# Patient Record
Sex: MALE | Race: BLACK OR AFRICAN AMERICAN | NOT HISPANIC OR LATINO | Employment: FULL TIME | URBAN - METROPOLITAN AREA
[De-identification: names, ages, dates, MRNs, and addresses within clinical notes are randomized per-mention and may not be internally consistent; named-entity substitution may affect disease eponyms.]

---

## 2021-06-15 ENCOUNTER — OFFICE VISIT (OUTPATIENT)
Dept: DERMATOLOGY | Facility: CLINIC | Age: 41
End: 2021-06-15
Payer: COMMERCIAL

## 2021-06-15 ENCOUNTER — APPOINTMENT (OUTPATIENT)
Dept: LAB | Facility: CLINIC | Age: 41
End: 2021-06-15
Payer: COMMERCIAL

## 2021-06-15 DIAGNOSIS — Z00.6 RESEARCH SUBJECT: Primary | ICD-10-CM

## 2021-06-15 PROCEDURE — 99207 PR NO CHARGE LOS: CPT

## 2021-06-15 NOTE — PATIENT INSTRUCTIONS

## 2021-06-15 NOTE — LETTER
6/15/2021       RE: Camden Wilson  [address] [facility] [address]     Dear Colleague,    Thank you for referring your patient, Camden Wilson, to the [facility] DERMATOLOGY CLINIC [facility]. Please see a copy of my visit note below.    [facility]  Nucleotide Excision Repair (NER) In Human Populations Study   Wong 15, 2021    Study Participant Name: Camden Wilson   : 1980    Study Participant: #4    Study Procedures Performed:  1. Patient consented and consent form (English) signed. Patient provided copy of consent form.   2. Patient provided $75 ClinCard as compensation for study participation; W-9 form completed.   3. NER questionnaire completed  4. Two 2 mm punch biopsies performed (see procedure note below)  5. Blood draw performed    Biopsy - Chronically Sun-Exposed Site (A): L dorsal hand  Punch biopsy:  After discussion of benefits and risks including but not limited to bleeding/bruising, pain/swelling, infection, scar, incomplete removal, nerve damage/numbness, recurrence, and non-diagnostic biopsy, written consent, verbal consent and photographs were obtained. Time-out was performed. The area was cleaned with isopropyl alcohol. 0.5mL of 1% lidocaine with epinephrine was injected to obtain adequate anesthesia of the lesion. A 2 mm punch biopsy was performed.  5-0 fast absorbing gut sutures were utilized to approximate the epidermal edges.  White petroleum jelly/VaselineTM and a bandage was applied to the wound.  Explicit verbal and written wound care instructions were provided.  The patient left the Dermatology Clinic in good condition.     Biopsy - Unexposed Site (B): L proximal inner arm  Punch biopsy:  After discussion of benefits and risks including but not limited to bleeding/bruising, pain/swelling, infection, scar, incomplete removal, nerve damage/numbness, recurrence, and non-diagnostic  biopsy, written consent, verbal consent and photographs were obtained. Time-out was performed. The area was cleaned with isopropyl alcohol. 0.5mL of 1% lidocaine with epinephrine was injected to obtain adequate anesthesia of the lesion. A 2 mm punch biopsy was performed.  5-0 fast absorbing gut sutures were utilized to approximate the epidermal edges.  White petroleum jelly/VaselineTM and a bandage was applied to the wound.  Explicit verbal and written wound care instructions were provided.  The patient left the Dermatology Clinic in good condition.    Staff Involved:  Fay Lopez MD - Research Fellow  Omer Recinos MD -     Omer Recinos MD  Pronouns: he/him/his    Department of Dermatology  [facility]: Phone: [phone], Fax:[phone]  [facility]: Phone: [phone] Fax: [phone]          Again, thank you for allowing me to participate in the care of your patient.      Sincerely,    Research Coordinator

## 2021-06-15 NOTE — PROGRESS NOTES
[facility]  Nucleotide Excision Repair (NER) In Human Populations Study   Wong 15, 2021    Study Participant Name: Camden Wilson   : 1980    Study Participant: #4    Study Procedures Performed:  1. Patient consented and consent form (English) signed. Patient provided copy of consent form.   2. Patient provided $75 ClinCard as compensation for study participation; W-9 form completed.   3. NER questionnaire completed  4. Two 2 mm punch biopsies performed (see procedure note below)  5. Blood draw performed    Biopsy - Chronically Sun-Exposed Site (A): L dorsal hand  Punch biopsy:  After discussion of benefits and risks including but not limited to bleeding/bruising, pain/swelling, infection, scar, incomplete removal, nerve damage/numbness, recurrence, and non-diagnostic biopsy, written consent, verbal consent and photographs were obtained. Time-out was performed. The area was cleaned with isopropyl alcohol. 0.5mL of 1% lidocaine with epinephrine was injected to obtain adequate anesthesia of the lesion. A 2 mm punch biopsy was performed.  5-0 fast absorbing gut sutures were utilized to approximate the epidermal edges.  White petroleum jelly/VaselineTM and a bandage was applied to the wound.  Explicit verbal and written wound care instructions were provided.  The patient left the Dermatology Clinic in good condition.     Biopsy - Unexposed Site (B): L proximal inner arm  Punch biopsy:  After discussion of benefits and risks including but not limited to bleeding/bruising, pain/swelling, infection, scar, incomplete removal, nerve damage/numbness, recurrence, and non-diagnostic biopsy, written consent, verbal consent and photographs were obtained. Time-out was performed. The area was cleaned with isopropyl alcohol. 0.5mL of 1% lidocaine with epinephrine was injected to obtain adequate anesthesia of the lesion. A 2 mm punch biopsy was performed.  5-0 fast absorbing gut sutures were utilized to  approximate the epidermal edges.  White petroleum jelly/VaselineTM and a bandage was applied to the wound.  Explicit verbal and written wound care instructions were provided.  The patient left the Dermatology Clinic in good condition.    Staff Involved:  Fay Lopez MD - Research Fellow  Omer Recinos MD -     Omer Recinos MD  Pronouns: he/him/his    Department of Dermatology  [facility]: Phone: [phone], Fax:[phone]  [facility]: Phone: [phone] Fax: [phone]

## 2021-06-15 NOTE — LETTER
Date:June 25, 2021      Patient was self referred, no letter generated. Do not send.        [facility] Health Information

## 2021-06-18 ENCOUNTER — OFFICE VISIT (OUTPATIENT)
Dept: DERMATOLOGY | Facility: CLINIC | Age: 41
End: 2021-06-18
Payer: COMMERCIAL

## 2021-06-18 DIAGNOSIS — L72.0 EIC (EPIDERMAL INCLUSION CYST): ICD-10-CM

## 2021-06-18 DIAGNOSIS — L64.9 ANDROGENETIC ALOPECIA: ICD-10-CM

## 2021-06-18 DIAGNOSIS — L30.9 DERMATITIS: ICD-10-CM

## 2021-06-18 DIAGNOSIS — L73.0 ACNE NUCHAE KELOIDALIS: ICD-10-CM

## 2021-06-18 PROCEDURE — 99203 OFFICE O/P NEW LOW 30 MIN: CPT | Mod: 25 | Performed by: PHYSICIAN ASSISTANT

## 2021-06-18 PROCEDURE — 11900 INJECT SKIN LESIONS </W 7: CPT | Performed by: PHYSICIAN ASSISTANT

## 2021-06-18 RX ORDER — FLUTICASONE PROPIONATE 44 UG/1
1 AEROSOL, METERED RESPIRATORY (INHALATION)
COMMUNITY
Start: 2020-01-14

## 2021-06-18 RX ORDER — TRIAMCINOLONE ACETONIDE 5 MG/G
OINTMENT TOPICAL 2 TIMES DAILY
COMMUNITY
End: 2021-06-18

## 2021-06-18 RX ORDER — CLOBETASOL PROPIONATE 0.5 MG/ML
SOLUTION TOPICAL 2 TIMES DAILY
COMMUNITY
End: 2021-06-18

## 2021-06-18 RX ORDER — CLOBETASOL PROPIONATE 0.5 MG/ML
SOLUTION TOPICAL 2 TIMES DAILY
Qty: 50 ML | Refills: 1 | Status: SHIPPED | OUTPATIENT
Start: 2021-06-18 | End: 2022-07-28

## 2021-06-18 RX ORDER — IRBESARTAN 300 MG/1
300 TABLET ORAL DAILY
COMMUNITY
Start: 2021-01-28 | End: 2022-07-12

## 2021-06-18 RX ORDER — HYDRALAZINE HYDROCHLORIDE 50 MG/1
TABLET, FILM COATED ORAL
COMMUNITY
Start: 2021-06-14

## 2021-06-18 RX ORDER — FLUOCINONIDE TOPICAL SOLUTION USP, 0.05% 0.5 MG/ML
SOLUTION TOPICAL
COMMUNITY
Start: 2019-07-10 | End: 2021-06-18

## 2021-06-18 RX ORDER — SPIRONOLACTONE 25 MG/1
25 TABLET ORAL DAILY
COMMUNITY
Start: 2021-01-28 | End: 2022-07-12

## 2021-06-18 RX ORDER — AMLODIPINE BESYLATE 10 MG/1
10 TABLET ORAL
COMMUNITY
Start: 2021-01-28 | End: 2022-07-12

## 2021-06-18 RX ORDER — TRIAMCINOLONE ACETONIDE 5 MG/G
1 OINTMENT TOPICAL 2 TIMES DAILY
Qty: 45 G | Refills: 3 | Status: SHIPPED | OUTPATIENT
Start: 2021-06-18 | End: 2021-11-22

## 2021-06-18 RX ORDER — CEPHALEXIN 500 MG/1
500 CAPSULE ORAL
COMMUNITY
Start: 2019-07-10 | End: 2021-06-18

## 2021-06-18 RX ORDER — MOMETASONE FUROATE MONOHYDRATE 50 UG/1
2 SPRAY, METERED NASAL
COMMUNITY
Start: 2021-01-28

## 2021-06-18 RX ORDER — CETIRIZINE HYDROCHLORIDE 10 MG/1
10 TABLET ORAL
COMMUNITY

## 2021-06-18 RX ORDER — CEPHALEXIN 500 MG/1
500 CAPSULE ORAL 2 TIMES DAILY
Qty: 60 CAPSULE | Refills: 0 | Status: SHIPPED | OUTPATIENT
Start: 2021-06-18 | End: 2022-08-31

## 2021-06-18 ASSESSMENT — PAIN SCALES - GENERAL: PAINLEVEL: NO PAIN (0)

## 2021-06-18 NOTE — PROGRESS NOTES
[facility] Dermatology Note  Encounter Date: Jun 18, 2021  Office Visit      Dermatology Problem List:  1. Acne Nuchae Keloidalis  -ILK 10 6/18/21  -clobetasol 0.05% solution, Keflex 500mg po BID x3-4 days prn with flares  2. Dermatitis - TAC 0.5% ointment, emollients  3. EIC - R upper mid back - derm surg referral placed  4. Alopecia, androgenetic - minoxidil 5% foam    Social: From [place]. Works for [employer]  ____________________________________________    Assessment & Plan:  # Cyst. R upper mid back  - referral for derm surg for excision    # Acne Nuchae keloidalis. Relatively well controlled on the following:   - IL-K injections performed today (see procedure note(s) below).  - Continue with clobetasol 0.05% solution, refilled today  - Keflex 500mg po BID x 3-4 days with flares - he does this 3-4x per year and it works well for him    # Eczematous dermatitis. Stable on the following:  - TAC - 2x per week, refilled today  - Moisturizers daily     # Androgenetic alopeica   - start minoxidil 5% foam daily, edu that it may take 3-4mo for results.   - future: finasteride    Procedures Performed:   - Intra-lesional triamcinolone procedure note. After positioning and cleansing with isopropyl alcohol, 1 total mL of triamcinolone 10 mg/mL was injected into 7 lesion(s) on the posterior scalp and R neck. The patient tolerated the procedure well and left the dermatology clinic in good condition.     Follow-up: prn for new or changing lesions    Staff:     All risks, benefits and alternatives were discussed with patient.  Patient is in agreement and understands the assessment and plan.  All questions were answered.    Ann Marie Jordan PA-C, MPAS  [facility] [place]: Phone: [phone], Fax: [phone]  [facility]: Phone: [phone],  Fax:  "[phone]  ____________________________________________    CC: Derm Problem (Camden is here because he says \"I need injections\")      HPI:  Mr. Camden Wilson is a 40 year old male who presents today as a return patient for a condition of the scalp. Last seen for research study with Dr. Recinos. Here today regarding several skin concerns including a hx of acne keloidalis. From [place], and saw dermatology there. Used to get ILK to the scalp about once per year. He has not had injections since last October. He has a fairly good regimen in placed that works well for him and keeps things mostly stable. He gets ILK about 1-2x per year, uses clobetasol liquid at night. Wears hair relatively short usually. Has an Rx for Keflex 500mg po, which he only takes with flares, and takes for just a few days. Additionally notes a lump on his back, it is not currently bothering him and knows removal would potential result in a scar, but is requesting a referral for excision so it is in the system for when he decides to pursue treatment. Notes he has eczema on his back. Right now it is well controlled. He uses triamcinolone 0.05% ointment once daily with flares but otherwise is diligent with moisturizers after bathing and this mostly controls it.     Patient is otherwise feeling well, without additional concerns.    Labs:  none    Physical Exam:  Vitals: There were no vitals taken for this visit.  SKIN: Focused examination of head and neck was performed.   - occipital scalp with scattered hyperpigmented papules, few on the lateral aspects of the neck and beard area as well  - 2cm cystic lesion on the R upper mid back, non tender, discrete  - receeding frontal hairline as well as some hair loss over the crown as well, otherwise scalp appears nml  - back appears nml, no eczematous patches today, but this is where he normally get them  - Osorio's skin type IV  - No other lesions of concern on areas examined. "     Medications:  Current Outpatient Medications   Medication     cephALEXin (KEFLEX) 500 MG capsule     cetirizine (ZYRTEC) 10 MG tablet     clobetasol (TEMOVATE) 0.05 % external solution     fluticasone (FLOVENT HFA) 44 MCG/ACT inhaler     hydrALAZINE (APRESOLINE) 50 MG tablet     irbesartan (AVAPRO) 300 MG tablet     mometasone (NASONEX) 50 MCG/ACT nasal spray     spironolactone (ALDACTONE) 25 MG tablet     triamcinolone (KENALOG) 0.5 % external ointment     amLODIPine (NORVASC) 10 MG tablet     fluocinonide (LIDEX) 0.05 % external solution     No current facility-administered medications for this visit.       Past Medical/Surgical History:   There is no problem list on file for this patient.    No past medical history on file.    CC Dr. Claudio on close of this encounter.

## 2021-06-18 NOTE — LETTER
6/18/2021       RE: Camden Wilson  [address]  [place] [facility] [place] [facility] [address]     Dear Colleague,    Thank you for referring your patient, Camden Wilson, to the [facility] DERMATOLOGY CLINIC [place] at [facility]. Please see a copy of my visit note below.    [facility] Dermatology Note  Encounter Date: Jun 18, 2021  Office Visit      Dermatology Problem List:  1. Acne Nuchae Keloidalis  -ILK 10 6/18/21  -clobetasol 0.05% solution, Keflex 500mg po BID x3-4 days prn with flares  2. Dermatitis - TAC 0.5% ointment, emollients  3. EIC - R upper mid back - derm surg referral placed  4. Alopecia, androgenetic - minoxidil 5% foam    Social: From [place]. Works for [employer]  ____________________________________________    Assessment & Plan:  # Cyst. R upper mid back  - referral for derm surg for excision    # Acne Nuchae keloidalis. Relatively well controlled on the following:   - IL-K injections performed today (see procedure note(s) below).  - Continue with clobetasol 0.05% solution, refilled today  - Keflex 500mg po BID x 3-4 days with flares - he does this 3-4x per year and it works well for him    # Eczematous dermatitis. Stable on the following:  - TAC - 2x per week, refilled today  - Moisturizers daily     # Androgenetic alopeica   - start minoxidil 5% foam daily, edu that it may take 3-4mo for results.   - future: finasteride    Procedures Performed:   - Intra-lesional triamcinolone procedure note. After positioning and cleansing with isopropyl alcohol, 1 total mL of triamcinolone 10 mg/mL was injected into 7 lesion(s) on the posterior scalp and R neck. The patient tolerated the procedure well and left the dermatology clinic in good condition.     Follow-up: prn for new or changing lesions    Staff:     All risks, benefits and alternatives were discussed with patient.  Patient is in agreement and  "understands the assessment and plan.  All questions were answered.    Ann Marie Jordan PA-C, MPAS  [facility] [place]: Phone: [phone], Fax: [phone]  [facility]: Phone: [phone],  Fax: [phone]  ____________________________________________    CC: Derm Problem (Camden is here because he says \"I need injections\")      HPI:  Mr. Camden Wilson is a 40 year old male who presents today as a return patient for a condition of the scalp. Last seen for research study with Dr. Recinos. Here today regarding several skin concerns including a hx of acne keloidalis. From [place], and saw dermatology there. Used to get ILK to the scalp about once per year. He has not had injections since last October. He has a fairly good regimen in placed that works well for him and keeps things mostly stable. He gets ILK about 1-2x per year, uses clobetasol liquid at night. Wears hair relatively short usually. Has an Rx for Keflex 500mg po, which he only takes with flares, and takes for just a few days. Additionally notes a lump on his back, it is not currently bothering him and knows removal would potential result in a scar, but is requesting a referral for excision so it is in the system for when he decides to pursue treatment. Notes he has eczema on his back. Right now it is well controlled. He uses triamcinolone 0.05% ointment once daily with flares but otherwise is diligent with moisturizers after bathing and this mostly controls it.     Patient is otherwise feeling well, without additional concerns.    Labs:  none    Physical Exam:  Vitals: There were no vitals taken for this visit.  SKIN: Focused examination of head and neck was performed.   - occipital scalp with scattered hyperpigmented papules, few on the lateral aspects of the neck and beard area as well  - 2cm cystic lesion on the R upper mid back, non tender, discrete  - " receeding frontal hairline as well as some hair loss over the crown as well, otherwise scalp appears nml  - back appears nml, no eczematous patches today, but this is where he normally get them  - Osorio's skin type IV  - No other lesions of concern on areas examined.     Medications:  Current Outpatient Medications   Medication     cephALEXin (KEFLEX) 500 MG capsule     cetirizine (ZYRTEC) 10 MG tablet     clobetasol (TEMOVATE) 0.05 % external solution     fluticasone (FLOVENT HFA) 44 MCG/ACT inhaler     hydrALAZINE (APRESOLINE) 50 MG tablet     irbesartan (AVAPRO) 300 MG tablet     mometasone (NASONEX) 50 MCG/ACT nasal spray     spironolactone (ALDACTONE) 25 MG tablet     triamcinolone (KENALOG) 0.5 % external ointment     amLODIPine (NORVASC) 10 MG tablet     fluocinonide (LIDEX) 0.05 % external solution     No current facility-administered medications for this visit.       Past Medical/Surgical History:   There is no problem list on file for this patient.    No past medical history on file.    CC Dr. Claudio on close of this encounter.

## 2021-06-18 NOTE — NURSING NOTE
Drug Administration Record    Prior to injection, verified patient identity using patient's name and date of birth.  Due to injection administration, patient instructed to remain in clinic for 15 minutes  afterwards, and to report any adverse reaction to me immediately.    Drug Name: triamcinolone acetonide(kenalog)  Dose: 1mL of triamcinolone 10mg/mL, 10mg dose  Route administered: ID  NDC #: Kenalog-10 (9857-3720-89)  Amount of waste(mL):4mL  Reason for waste: Single use vial    LOT #: [id number]  SITE: see provider's note  : [employer]  EXPIRATION DATE: 09/2022

## 2021-06-18 NOTE — NURSING NOTE
"Dermatology Rooming Note    Camden Wilson's goals for this visit include:   Chief Complaint   Patient presents with     Derm Problem     Camden is here because he says \"I need injections\"     Nadiya Boateng, CRISTY    "

## 2021-06-21 ENCOUNTER — TELEPHONE (OUTPATIENT)
Dept: DERMATOLOGY | Facility: CLINIC | Age: 41
End: 2021-06-21

## 2021-06-21 NOTE — TELEPHONE ENCOUNTER
patient will be calling back to schedule once he knows what the Fall looks like     Alessandro Tavera, Giselle

## 2021-06-27 ENCOUNTER — HEALTH MAINTENANCE LETTER (OUTPATIENT)
Age: 41
End: 2021-06-27

## 2021-08-23 ENCOUNTER — OFFICE VISIT (OUTPATIENT)
Dept: DERMATOLOGY | Facility: CLINIC | Age: 41
End: 2021-08-23
Payer: COMMERCIAL

## 2021-08-23 DIAGNOSIS — L70.0 ACNE VULGARIS: Primary | ICD-10-CM

## 2021-08-23 PROCEDURE — 99214 OFFICE O/P EST MOD 30 MIN: CPT | Performed by: PHYSICIAN ASSISTANT

## 2021-08-23 RX ORDER — DOXYCYCLINE 100 MG/1
100 CAPSULE ORAL 2 TIMES DAILY
Qty: 60 CAPSULE | Refills: 3 | Status: SHIPPED | OUTPATIENT
Start: 2021-08-23 | End: 2021-11-22

## 2021-08-23 RX ORDER — TRETINOIN 0.5 MG/G
CREAM TOPICAL AT BEDTIME
Qty: 45 G | Refills: 3 | Status: SHIPPED | OUTPATIENT
Start: 2021-08-23 | End: 2023-07-05

## 2021-08-23 ASSESSMENT — PAIN SCALES - GENERAL: PAINLEVEL: NO PAIN (0)

## 2021-08-23 NOTE — LETTER
8/23/2021       RE: Camden Wilson  [address]  [place] [address]     Dear Colleague,    Thank you for referring your patient, Camden Wilson, to the [facility] DERMATOLOGY CLINIC [place] at [facility]. Please see a copy of my visit note below.    [facility] Dermatology Note  Encounter Date: Aug 23, 2021  Office Visit     Dermatology Problem List:  #. Acne Nuchae Keloidalis  -ILK 10 6/18/21  -clobetasol 0.05% solution, Keflex 500mg po BID x3-4 days prn with flares  #. Cystic acne, back  - Current tx: doxycycline 100 mg bid, tretinoin 0.05% cream  #. Dermatitis - TAC 0.5% ointment, emollients  #. EIC - R upper mid back - derm surg referral placed  #. 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   ____________________________________________    Assessment & Plan:    # Cystic and inflammatory acne, back  - Start doxycycline 100 mg bid. Reviewed risks/benefits, including GI upset, sun sensitivity.   - Start tretinoin 0.05% cream to the back at bedtime.     # Eczematous dermatitis. Stable on the following:  - Continue TAC - 2x per week  - Moisturizers daily     # EIC, right back  - Plan for removal with derm surgery. Pt to schedule.      Procedures Performed:   None    Follow-up: 3 months or sooner as needed.     Staff and Scribe:     Provider Disclosure:   The documentation recorded by the scribe accurately reflects the services I personally performed and the decisions made by me.    All risks, benefits and alternatives were discussed with patient.  Patient is in agreement and understands the assessment and plan.  All questions were answered.  Sun Screen Education was given.   Return to Clinic in 3 months or sooner as needed.   Polly Diaz PA-C   [facility] Dermatology [facility]     Scribe Disclosure:  ILeticia am  serving as a scribe to document services personally performed by Polly Diaz PA-C based on data collection and the provider's statements to me.   ____________________________________________    CC: Derm Problem (Camden is here for a skin check on his back, says he has several spots that he would like evaluated today)    HPI:  Mr. Camden Wilson is a(n) 40 year old male who presents today as a return patient for FBSE.    Last seen on 6/18/21 by Ann Marie Jordan PA-C, at which time 1 mL of Kenalog 10 was injected into the posterior scalp and R neck for treatment of acne nuchae keloidalis. Additionally, he was advised to continue clobetasol 0.05% solution and start keflex for flares. For treatment of eczematous dermatitis, patient was to continue TAC 2x weekly prn. For treatment of androgenetic alopecia he was recommended minoxidil 5% foam.     Today, the patient points to cystic spots on the back that he would like evaluated. No significant family history of acne. He runs every morning and likes to bike/play tennis. He has some GI upset with cardio workouts and occasionally takes probiotic.     Patient is otherwise feeling well, without additional skin concerns. Of note, his mother recently passed.     Labs Reviewed:  N/A    Physical Exam:  Vitals: There were no vitals taken for this visit.  SKIN: Focused examination of the back was performed. He defers further examination.   - Scattered skin colored cystic papules and pustules throughout the back with scattered open comedones and several areas of PIH  - 2 cm dome shaped nodule on the right back  - No other lesions of concern on areas examined.     Medications:  Current Outpatient Medications   Medication     amLODIPine (NORVASC) 10 MG tablet     cephALEXin (KEFLEX) 500 MG capsule     cetirizine (ZYRTEC) 10 MG tablet     clobetasol (TEMOVATE) 0.05 % external solution     fluticasone (FLOVENT HFA) 44 MCG/ACT inhaler     hydrALAZINE (APRESOLINE) 50 MG tablet      irbesartan (AVAPRO) 300 MG tablet     mometasone (NASONEX) 50 MCG/ACT nasal spray     spironolactone (ALDACTONE) 25 MG tablet     triamcinolone (KENALOG) 0.5 % external ointment     No current facility-administered medications for this visit.     Past Medical History:   There is no problem list on file for this patient.    No past medical history on file.     CC Referred Self, MD  No address on file on close of this encounter.

## 2021-08-23 NOTE — PROGRESS NOTES
[facility] Dermatology Note  Encounter Date: Aug 23, 2021  Office Visit     Dermatology Problem List:  #. Acne Nuchae Keloidalis  -ILK 10 6/18/21  -clobetasol 0.05% solution, Keflex 500mg po BID x3-4 days prn with flares  #. Cystic acne, back  - Current tx: doxycycline 100 mg bid, tretinoin 0.05% cream  #. Dermatitis - TAC 0.5% ointment, emollients  #. EIC - R upper mid back - derm surg referral placed  #. 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   ____________________________________________    Assessment & Plan:    # Cystic and inflammatory acne, back  - Start doxycycline 100 mg bid. Reviewed risks/benefits, including GI upset, sun sensitivity.   - Start tretinoin 0.05% cream to the back at bedtime.     # Eczematous dermatitis. Stable on the following:  - Continue TAC - 2x per week  - Moisturizers daily     # EIC, right back  - Plan for removal with derm surgery. Pt to schedule.      Procedures Performed:   None    Follow-up: 3 months or sooner as needed.     Staff and Scribe:     Provider Disclosure:   The documentation recorded by the scribe accurately reflects the services I personally performed and the decisions made by me.    All risks, benefits and alternatives were discussed with patient.  Patient is in agreement and understands the assessment and plan.  All questions were answered.  Sun Screen Education was given.   Return to Clinic in 3 months or sooner as needed.   Polly Diaz PA-C   [facility]     Scribe Disclosure:  I, Leticia Whitfield, am serving as a scribe to document services personally performed by Polly Diaz PA-C based on data collection and the provider's statements to me.   ____________________________________________    CC: Derm Problem (Camden is here for a skin check on his back, says he has several spots that he would like  evaluated today)    HPI:  Mr. Camden Wilson is a(n) 40 year old male who presents today as a return patient for FBSE.    Last seen on 6/18/21 by Ann Marie Jordan PA-C, at which time 1 mL of Kenalog 10 was injected into the posterior scalp and R neck for treatment of acne nuchae keloidalis. Additionally, he was advised to continue clobetasol 0.05% solution and start keflex for flares. For treatment of eczematous dermatitis, patient was to continue TAC 2x weekly prn. For treatment of androgenetic alopecia he was recommended minoxidil 5% foam.     Today, the patient points to cystic spots on the back that he would like evaluated. No significant family history of acne. He runs every morning and likes to bike/play tennis. He has some GI upset with cardio workouts and occasionally takes probiotic.     Patient is otherwise feeling well, without additional skin concerns. Of note, his mother recently passed.     Labs Reviewed:  N/A    Physical Exam:  Vitals: There were no vitals taken for this visit.  SKIN: Focused examination of the back was performed. He defers further examination.   - Scattered skin colored cystic papules and pustules throughout the back with scattered open comedones and several areas of PIH  - 2 cm dome shaped nodule on the right back  - No other lesions of concern on areas examined.     Medications:  Current Outpatient Medications   Medication     amLODIPine (NORVASC) 10 MG tablet     cephALEXin (KEFLEX) 500 MG capsule     cetirizine (ZYRTEC) 10 MG tablet     clobetasol (TEMOVATE) 0.05 % external solution     fluticasone (FLOVENT HFA) 44 MCG/ACT inhaler     hydrALAZINE (APRESOLINE) 50 MG tablet     irbesartan (AVAPRO) 300 MG tablet     mometasone (NASONEX) 50 MCG/ACT nasal spray     spironolactone (ALDACTONE) 25 MG tablet     triamcinolone (KENALOG) 0.5 % external ointment     No current facility-administered medications for this visit.     Past Medical History:   There is no problem list on file for  this patient.    No past medical history on file.     CC Referred Self, MD  No address on file on close of this encounter.

## 2021-08-23 NOTE — NURSING NOTE
Dermatology Rooming Note    Camden Wilson's goals for this visit include:   Chief Complaint   Patient presents with     Derm Problem     Rowena is here for a skin check on his back, says he has several spots that he would like evaluated today           Omer Way, Paramedic

## 2021-10-17 ENCOUNTER — HEALTH MAINTENANCE LETTER (OUTPATIENT)
Age: 41
End: 2021-10-17

## 2021-11-22 ENCOUNTER — TELEPHONE (OUTPATIENT)
Dept: DERMATOLOGY | Facility: CLINIC | Age: 41
End: 2021-11-22

## 2021-11-22 ENCOUNTER — OFFICE VISIT (OUTPATIENT)
Dept: DERMATOLOGY | Facility: CLINIC | Age: 41
End: 2021-11-22
Payer: COMMERCIAL

## 2021-11-22 DIAGNOSIS — L72.9 CYST OF SKIN: Primary | ICD-10-CM

## 2021-11-22 PROCEDURE — 99213 OFFICE O/P EST LOW 20 MIN: CPT | Performed by: PHYSICIAN ASSISTANT

## 2021-11-22 ASSESSMENT — PAIN SCALES - GENERAL: PAINLEVEL: NO PAIN (0)

## 2021-11-22 NOTE — TELEPHONE ENCOUNTER
[facility] Call Center    Phone Message    May a detailed message be left on voicemail: yes     Reason for Call: Other: Pt needs to schedule cyst removal. Please call pt to assist. Referral in chart    Action Taken: Message routed to:  [facility] LIZETTE    Travel Screening: Not Applicable

## 2021-11-22 NOTE — PROGRESS NOTES
[facility] Dermatology Note  Encounter Date: Nov 22, 2021  Office Visit     Dermatology Problem List:  # Acne Nuchae Keloidalis  - ILK 10 6/18/21  - clobetasol 0.05% solution, Keflex 500mg po BID x3-4 days prn with flares  # Cystic acne, back  - Current tx: tretinoin 0.05% cream  - Previous tx: doxycycline 100 mg BID  # Dermatitis - TAC 0.5% ointment, emollients  # EIC - R upper mid back - derm surg referral placed  # Alopecia, androgenetic - minoxidil 5% foam     Social: From [place]. 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     ____________________________________________    Assessment & Plan:    # Cystic and inflammatory acne, back  - Stop doxycycline. Gradually taper off over the next 2 weeks.  - Continue tretinoin 0.05% cream to the back at bedtime.     # Eczematous dermatitis.  - Continue triamcinolone 0.1% ointment prn  - Moisturizers daily      # EIC, right upper back  - Plan for removal with derm surgery. Pt to schedule.      # Acne nuchae keloidalis  - Patient has clobetasol 0.05% solution and cephalexin to use for flares    Procedures Performed:   None    Follow-up: 3 month(s) in-person, or earlier for new or changing lesions    Staff and Scribe:     Scribe Disclosure:  JUAN, Bro Werner, am serving as a scribe to document services personally performed by Polly Diaz PA-C based on data collection and the provider's statements to me.     Provider Disclosure:   The documentation recorded by the scribe accurately reflects the services I personally performed and the decisions made by me.    All risks, benefits and alternatives were discussed with patient.  Patient is in agreement and understands the assessment and plan.  All questions were answered.  Sun Screen Education was given.   Return to Clinic in 3 months or sooner as needed.   Polly Diaz PA-C   [facility]    ____________________________________________    CC: Derm Problem (pt states he is here for a follow up acne.)    HPI:  Mr. Camden Wilson is a(n) 40 year old male who presents today as a return patient for follow-up. Last seen by me on 8/23/2021, at which time patient was started on doxycycline 100 mg BID and tretinoin 0.05% cream at bedtime for treatment of cystic and inflammatory acne. Additionally, patient was referred to derm surgery for excision of an EIC on the right back.    Today, patient reports itch on his back but denies associated pain. Patient would like to reduce the amount of antibiotics that he is taking.    Patient is otherwise feeling well, without additional skin concerns.    Labs Reviewed:  N/A    Physical Exam:  Vitals: There were no vitals taken for this visit.  SKIN: Focused examination of back and scalp was performed.  - On the right upper back, there is a 3 x 3 cm dome shaped nodule.  - Scattered hyperemic macules to the back diffusely from previous eruptions.  - Mild xerosis noted to the back and a few very superficial papules on the upper back.  - No active lesions on the posterior scalp.  - No other lesions of concern on areas examined.     Medications:  Current Outpatient Medications   Medication     amLODIPine (NORVASC) 10 MG tablet     cephALEXin (KEFLEX) 500 MG capsule     cetirizine (ZYRTEC) 10 MG tablet     clobetasol (TEMOVATE) 0.05 % external solution     doxycycline monohydrate (MONODOX) 100 MG capsule     fluticasone (FLOVENT HFA) 44 MCG/ACT inhaler     hydrALAZINE (APRESOLINE) 50 MG tablet     irbesartan (AVAPRO) 300 MG tablet     mometasone (NASONEX) 50 MCG/ACT nasal spray     spironolactone (ALDACTONE) 25 MG tablet     tretinoin (RETIN-A) 0.05 % external cream     triamcinolone (KENALOG) 0.5 % external ointment     No current facility-administered medications for this visit.      Past Medical History:   There is no problem list on file for this patient.    No past medical  history on file.     CC Referred Self, MD  No address on file on close of this encounter.

## 2021-11-22 NOTE — LETTER
11/22/2021       RE: Camden Wilson  [address]  [facility] [place] [address]     Dear Colleague,    Thank you for referring your patient, Camden Wilson, to the [facility] DERMATOLOGY CLINIC [place] [facility]. Please see a copy of my visit note below.    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     ____________________________________________    Assessment & Plan:    # Cystic and inflammatory acne, back  - Stop doxycycline. Gradually taper off over the next 2 weeks.  - Continue tretinoin 0.05% cream to the back at bedtime.     # Eczematous dermatitis.  - Continue triamcinolone 0.1% ointment prn  - Moisturizers daily      # EIC, right upper back  - Plan for removal with derm surgery. Pt to schedule.      # Acne nuchae keloidalis  - Patient has clobetasol 0.05% solution and cephalexin to use for flares    Procedures Performed:   None    Follow-up: 3 month(s) in-person, or earlier for new or changing lesions    Staff and Scribe:     Scribe Disclosure:  I, Bro Werner, am serving as a scribe to document services personally performed by Polly Diaz PA-C based on data collection and the provider's statements to me.     Provider Disclosure:   The documentation recorded by the scribe accurately reflects the services I personally performed and the decisions made by me.    All risks, benefits and alternatives were  discussed with patient.  Patient is in agreement and understands the assessment and plan.  All questions were answered.  Sun Screen Education was given.   Return to Clinic in 3 months or sooner as needed.   Polly Diaz PA-C   [facility]   ____________________________________________    CC: Derm Problem (pt states he is here for a follow up acne.)    HPI:  Mr. Camden Wilson is a(n) 40 year old male who presents today as a return patient for follow-up. Last seen by me on 8/23/2021, at which time patient was started on doxycycline 100 mg BID and tretinoin 0.05% cream at bedtime for treatment of cystic and inflammatory acne. Additionally, patient was referred to derm surgery for excision of an EIC on the right back.    Today, patient reports itch on his back but denies associated pain. Patient would like to reduce the amount of antibiotics that he is taking.    Patient is otherwise feeling well, without additional skin concerns.    Labs Reviewed:  N/A    Physical Exam:  Vitals: There were no vitals taken for this visit.  SKIN: Focused examination of back and scalp was performed.  - On the right upper back, there is a 3 x 3 cm dome shaped nodule.  - Scattered hyperemic macules to the back diffusely from previous eruptions.  - Mild xerosis noted to the back and a few very superficial papules on the upper back.  - No active lesions on the posterior scalp.  - No other lesions of concern on areas examined.     Medications:  Current Outpatient Medications   Medication     amLODIPine (NORVASC) 10 MG tablet     cephALEXin (KEFLEX) 500 MG capsule     cetirizine (ZYRTEC) 10 MG tablet     clobetasol (TEMOVATE) 0.05 % external solution     doxycycline monohydrate (MONODOX) 100 MG capsule     fluticasone (FLOVENT HFA) 44 MCG/ACT inhaler     hydrALAZINE (APRESOLINE) 50 MG tablet     irbesartan (AVAPRO) 300 MG tablet     mometasone (NASONEX) 50 MCG/ACT nasal spray     spironolactone (ALDACTONE) 25  MG tablet     tretinoin (RETIN-A) 0.05 % external cream     triamcinolone (KENALOG) 0.5 % external ointment     No current facility-administered medications for this visit.      Past Medical History:   There is no problem list on file for this patient.    No past medical history on file.     CC Referred Self, MD  No address on file on close of this encounter.

## 2021-11-24 ENCOUNTER — TELEPHONE (OUTPATIENT)
Dept: DERMATOLOGY | Facility: CLINIC | Age: 41
End: 2021-11-24
Payer: COMMERCIAL

## 2021-11-30 NOTE — TELEPHONE ENCOUNTER
Pt called and scheduled a F/U for March - Then pt asked to talk to someone for a procedure that he needs to schedule, told pt that he has one scheduled for 1/10 and he is very confused and frustrated that he was not made aware of this appointment. Told Pt I would send an message to the clinic regarding it and he got upset due to the fact that when the clinic calls he can not answer and does not like playing phone tag. Pt stated he is going to send a message over as well but asked if I also could. Please call Pt at [phone] to further discuss. Thank you.

## 2021-12-02 NOTE — TELEPHONE ENCOUNTER
Returned call to patient. Patient canceled 1/10 appointment and does not wish to reschedule. Pt reports he will call daily between now and the end of December in hopes of getting in for a cancellation spot. Patient very frustrated on the phone and reports he may go back to [facility]. Supported patient in doing whatever he prefers to do and encouraged him to try [facility] as they may have availability sooner than we can offer. Pt hung up before writer could provide call back or any further information.

## 2022-05-03 NOTE — PROGRESS NOTES
[facility] Dermatology Note  Encounter Date: May 4, 2022  Office Visit     Dermatology Problem List:  1. Acne Nuchae Keloidalis  - ILK 10 6/18/21, 5/4/2022   - clobetasol 0.05% solution, Keflex 500mg po BID x3-4 days prn with flares  2. Cystic acne, back  - Current tx: tretinoin 0.05% cream  - Previous tx: doxycycline 100 mg BID  3. Dermatitis   - TAC 0.5% ointment, Elidel, emollients  4. EIC - R upper mid back - surg referral placed  5. 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     ____________________________________________    Assessment & Plan:     # Atopic dermatitis. Chronic, flare/not at goal.   - Decrease triamcinolone 0.1% cream BID to weekends  - Start Elidel cream BID on weekdays  - Recommended moisturizer immediately after shower  - Recommended soaking back in bath weekly.    # Acne vulgaris. Chronic, stable/improved. Mostly PIH on exam today. Will adjust topicals for eczema as above.     # Acne nuchae keloidalis  - ILK today, see procedure below  - Can continue clobetasol 0.05% solution PRN flares; keflex 500 mg PO BID x 3-4 days PRN flares    # EIC, right upper back. Has been referred to derm surg, but on waitlist due to lack of staffing to perform benign excision. Did discuss referral to general surgery for removal and patient was agreeable. Risks/benefits of the procedure discussed.   - Referral to general surgery for excision    Procedures Performed:   - Intra-lesional triamcinolone procedure note. After positioning and cleansing with isopropyl alcohol, 0.3 total mL of triamcinolone 10 mg/mL was injected into 2 lesion(s) on the occipital scalp. The patient tolerated the procedure well and left the dermatology clinic in good condition.     Follow-up: 3 month(s) in-person, or earlier for new or changing lesions    Staff and Scribe:     Scribe Disclosure:  Bro MARTINEZ, am serving  as a scribe to document services personally performed by Omer Recinos MD based on data collection and the provider's statements to me.     Provider Disclosure:   The documentation recorded by the scribe accurately reflects the services I personally performed and the decisions made by me.    Omer Recinos MD    Department of Dermatology  [facility]: Phone: [phone], Fax:[phone]  [facility]: Phone: [phone] Fax: [phone]  ____________________________________________    CC: Eczema (Camden is here today for eczema on the back)    HPI:  Mr. Camden Wilson is a(n) 41 year old male who presents today as a return patient for eczema follow-up. Last seen in dermatology by Polly Diaz PA-C, on 11/22/2021, at which time patient was discontinued on doxycyline for treatment of cystic and inflammatory acne.    Today, patient reports that he has been using triamcinolone on his back. He also reports raised spots on the posterior scalp that he would like examined.    Patient is otherwise feeling well, without additional skin concerns.    Labs Reviewed:  N/A    Physical Exam:  Vitals: There were no vitals taken for this visit.  SKIN: Focused examination of back and posterior scalp was performed.  - Numerous hyperpigmented macules on bilateral back consistent with postinflammatory hyperpigmentation.  - No active acneiform papules or pustules.  - Few scattered, thin, pink scaly plaques on the bilateral central back.  - Excoriated follicular based papules on the occipital scalp.  - 3 cm cystic nodule with central punctum on the right upper back.  - No other lesions of concern on areas examined.     Medications:  Current Outpatient Medications   Medication     cephALEXin (KEFLEX) 500 MG capsule     cetirizine (ZYRTEC) 10 MG tablet     clobetasol (TEMOVATE) 0.05 % external solution      fluticasone (FLOVENT HFA) 44 MCG/ACT inhaler     hydrALAZINE (APRESOLINE) 50 MG tablet     mometasone (NASONEX) 50 MCG/ACT nasal spray     tretinoin (RETIN-A) 0.05 % external cream     amLODIPine (NORVASC) 10 MG tablet     irbesartan (AVAPRO) 300 MG tablet     spironolactone (ALDACTONE) 25 MG tablet     No current facility-administered medications for this visit.      Past Medical History:   There is no problem list on file for this patient.    No past medical history on file.

## 2022-05-04 ENCOUNTER — OFFICE VISIT (OUTPATIENT)
Dept: DERMATOLOGY | Facility: CLINIC | Age: 42
End: 2022-05-04
Payer: COMMERCIAL

## 2022-05-04 DIAGNOSIS — L20.9 ATOPIC DERMATITIS, UNSPECIFIED TYPE: Primary | ICD-10-CM

## 2022-05-04 DIAGNOSIS — L72.0 EIC (EPIDERMAL INCLUSION CYST): ICD-10-CM

## 2022-05-04 DIAGNOSIS — L73.0 ACNE NUCHAE KELOIDALIS: ICD-10-CM

## 2022-05-04 DIAGNOSIS — L70.0 ACNE VULGARIS: ICD-10-CM

## 2022-05-04 PROCEDURE — 11900 INJECT SKIN LESIONS </W 7: CPT | Performed by: DERMATOLOGY

## 2022-05-04 PROCEDURE — 99214 OFFICE O/P EST MOD 30 MIN: CPT | Mod: 25 | Performed by: DERMATOLOGY

## 2022-05-04 RX ORDER — PIMECROLIMUS 10 MG/G
CREAM TOPICAL
Qty: 100 G | Refills: 11 | Status: SHIPPED | OUTPATIENT
Start: 2022-05-04

## 2022-05-04 RX ORDER — TRIAMCINOLONE ACETONIDE 1 MG/G
CREAM TOPICAL
Qty: 454 G | Refills: 11 | Status: SHIPPED | OUTPATIENT
Start: 2022-05-04 | End: 2023-09-08

## 2022-05-04 ASSESSMENT — PAIN SCALES - GENERAL: PAINLEVEL: NO PAIN (0)

## 2022-05-04 NOTE — NURSING NOTE
Dermatology Rooming Note    Camden Wilson's goals for this visit include:   Chief Complaint   Patient presents with     Eczema     Camden is here today for eczema on the back     Anne Marie Davenport EMT

## 2022-05-04 NOTE — LETTER
Date:May 5, 2022      Patient was self referred, no letter generated. Do not send.        [facility] Information

## 2022-05-04 NOTE — LETTER
5/4/2022       RE: Camden Wilson  [address]  [facility] [address]     Dear Colleague,    Thank you for referring your patient, Camden Wilson, to the [facility] DERMATOLOGY CLINIC [place] at [facility]. Please see a copy of my visit note below.    [facility] Dermatology Note  Encounter Date: May 4, 2022  Office Visit     Dermatology Problem List:  1. Acne Nuchae Keloidalis  - ILK 10 6/18/21, 5/4/2022   - clobetasol 0.05% solution, Keflex 500mg po BID x3-4 days prn with flares  2. Cystic acne, back  - Current tx: tretinoin 0.05% cream  - Previous tx: doxycycline 100 mg BID  3. Dermatitis   - TAC 0.5% ointment, Elidel, emollients  4. EIC - R upper mid back - surg referral placed  5. 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     ____________________________________________    Assessment & Plan:     # Atopic dermatitis. Chronic, flare/not at goal.   - Decrease triamcinolone 0.1% cream BID to weekends  - Start Elidel cream BID on weekdays  - Recommended moisturizer immediately after shower  - Recommended soaking back in bath weekly.    # Acne vulgaris. Chronic, stable/improved. Mostly PIH on exam today. Will adjust topicals for eczema as above.     # Acne nuchae keloidalis  - ILK today, see procedure below  - Can continue clobetasol 0.05% solution PRN flares; keflex 500 mg PO BID x 3-4 days PRN flares    # EIC, right upper back. Has been referred to derm surg, but on waitlist due to lack of staffing to perform benign excision. Did discuss referral to general surgery for removal and patient was agreeable. Risks/benefits of the procedure discussed.   - Referral to general surgery for excision    Procedures Performed:   - Intra-lesional triamcinolone procedure note. After positioning and cleansing with isopropyl alcohol, 0.3 total mL of  triamcinolone 10 mg/mL was injected into 2 lesion(s) on the occipital scalp. The patient tolerated the procedure well and left the dermatology clinic in good condition.     Follow-up: 3 month(s) in-person, or earlier for new or changing lesions    Staff and Scribe:     Scribe Disclosure:  I, Bro Werner, am serving as a scribe to document services personally performed by Omer Recinos MD based on data collection and the provider's statements to me.     Provider Disclosure:   The documentation recorded by the scribe accurately reflects the services I personally performed and the decisions made by me.    Omer Recinos MD    Department of Dermatology  [facility]: Phone: [phone], Fax:[phone]  [facility]: Phone: [phone] Fax: [phone]  ____________________________________________    CC: Eczema (Camden is here today for eczema on the back)    HPI:  Mr. Camden Wilson is a(n) 41 year old male who presents today as a return patient for eczema follow-up. Last seen in dermatology by Polly Diaz PA-C, on 11/22/2021, at which time patient was discontinued on doxycyline for treatment of cystic and inflammatory acne.    Today, patient reports that he has been using triamcinolone on his back. He also reports raised spots on the posterior scalp that he would like examined.    Patient is otherwise feeling well, without additional skin concerns.    Labs Reviewed:  N/A    Physical Exam:  Vitals: There were no vitals taken for this visit.  SKIN: Focused examination of back and posterior scalp was performed.  - Numerous hyperpigmented macules on bilateral back consistent with postinflammatory hyperpigmentation.  - No active acneiform papules or pustules.  - Few scattered, thin, pink scaly plaques on the bilateral central back.  - Excoriated follicular based papules on the  occipital scalp.  - 3 cm cystic nodule with central punctum on the right upper back.  - No other lesions of concern on areas examined.     Medications:  Current Outpatient Medications   Medication     cephALEXin (KEFLEX) 500 MG capsule     cetirizine (ZYRTEC) 10 MG tablet     clobetasol (TEMOVATE) 0.05 % external solution     fluticasone (FLOVENT HFA) 44 MCG/ACT inhaler     hydrALAZINE (APRESOLINE) 50 MG tablet     mometasone (NASONEX) 50 MCG/ACT nasal spray     tretinoin (RETIN-A) 0.05 % external cream     amLODIPine (NORVASC) 10 MG tablet     irbesartan (AVAPRO) 300 MG tablet     spironolactone (ALDACTONE) 25 MG tablet     No current facility-administered medications for this visit.      Past Medical History:   There is no problem list on file for this patient.    No past medical history on file.         Again, thank you for allowing me to participate in the care of your patient.      Sincerely,    Omer Recinos MD

## 2022-05-04 NOTE — PATIENT INSTRUCTIONS
Recommend a bath at least once weekly. Soak for 15 minutes. Pat dry and put on Eucerin moisturizer afterwards.  Continue moisturizer.   On weekdays, start elidel 0.1% cream twice daily on weekdays  On weekends, use the triamcinolone 0.1% cream twice daily on weekends.

## 2022-06-09 NOTE — TELEPHONE ENCOUNTER
REFERRAL INFORMATION:    Referring Provider:  Grisel    Referring Clinic:  Derm    Reason for Visit/Diagnosis: **Right upper back cyst, no imaging       FUTURE VISIT INFORMATION:    Appointment Date: 6.15.22    Appointment Time: 11:30     NOTES RECORD STATUS  DETAILS   OFFICE NOTE from Referring Provider Internal 5.4.22  Grisel Bolton

## 2022-06-14 ENCOUNTER — PATIENT OUTREACH (OUTPATIENT)
Dept: SURGERY | Facility: CLINIC | Age: 42
End: 2022-06-14
Payer: COMMERCIAL

## 2022-06-14 NOTE — PROGRESS NOTES
Patient Telephone Reminder Call    Date of call:  06/14/22  Phone numbers:  Home number on file [phone] (home)    Reached patient/confirmed appointment:  Yes  Appointment with:   Dr. Uche Maldonado  Reason for visit:  Mass

## 2022-06-15 ENCOUNTER — OFFICE VISIT (OUTPATIENT)
Dept: SURGERY | Facility: CLINIC | Age: 42
End: 2022-06-15
Attending: DERMATOLOGY
Payer: COMMERCIAL

## 2022-06-15 ENCOUNTER — PRE VISIT (OUTPATIENT)
Dept: SURGERY | Facility: CLINIC | Age: 42
End: 2022-06-15

## 2022-06-15 DIAGNOSIS — L72.0 EIC (EPIDERMAL INCLUSION CYST): ICD-10-CM

## 2022-06-15 PROCEDURE — 99202 OFFICE O/P NEW SF 15 MIN: CPT | Performed by: SURGERY

## 2022-06-15 NOTE — LETTER
6/15/2022       RE: Camden Wilson  [address]  [facility] [address]     Dear Colleague,    Thank you for referring your patient, Camden Wilson, to the [facility] GENERAL SURGERY CLINIC [place] at [facility]. Please see a copy of my visit note below.    I saw Camden Wilson today in clinic for evaluation of a mass located at his right upper back.  It has been present for years and is not clearly enlarging over time.  They have a history of similar lesions.   No prior infection or trauma to the site.  They have had no imaging of the lesion.      Smoker: no  Meds: reviewed  All: NDKA   Recent illness: no  Easy bleeding/bruising: yes    Exam:    There were no vitals taken for this visit.  A+Ox3, NAD  RRR  No resp distress or wheezing  Abd nondistended.    Along the right upper back, medial to the scapula there is a palpable mass roughly 2cm in size.  It is noteasily mobile and is not fixed to underlying structures.  There is no overlying skin changes.  Nontender to palpation.  A central pit is noted    Imaging:  none      A/P:  Slowly enlarging soft tissue mass along the right upper back, consistent with a sebaceous cyst.  We discussed the risks/benefits of excision (bleeding, infection, recurrence, injury to surrounding tissue) and possible observation.  They would like to proceed with surgery.     To the OR for excision under local      Sincerely,    Uche Maldonado MD

## 2022-06-15 NOTE — NURSING NOTE
Chief Complaint   Patient presents with     New Patient     Right upper back cyst       There were no vitals filed for this visit.   Vitals declined per pt.    There is no height or weight on file to calculate BMI.    Carmela Perrin, CMA

## 2022-06-15 NOTE — NURSING NOTE
Pre and Post op Patient Education/Teaching Flowsheet  Relevant Diagnosis:  Right back mass  Teaching Topic:  Pre and post op teaching  Person(s) Involved in teaching:  Patient     Motivation Level:  Asks Questions:  Yes  Eager to Learn:  Yes  Cooperative:  Yes  Receptive (willing/able to accept information):  Yes  Any cultural factors/Buddhist beliefs that may influence understanding or compliance?  No    Patient/caregiver/family demonstrates understanding of the following:  Reason for the appointment, diagnosis, and treatment plan:  Yes  Patient demonstrates understanding of the following:  Pre-op bowel prep:  N/A  Post-op pain management recommendations (medications, ice compress, binder/athletic supporter (if applicable), etc.:  Yes  Inguinal hernia patients:  Post-op urinary retention- discussed signs/symptoms and visit to ER for [place]ne catheter placement and to stay in place for at least 48 hours:  NA  Restrictions:  Yes  Medications to take the day of surgery:  Per PCP  Blood thinner medications discussed and when to stop (if applicable):  Yes  Wound care:  Yes  Diabetes medication management (if applicable):  Per PCP  Which situations necessitate calling provider and whom to contact:  Discussed how to contact the hospital, nurse, and clinic scheduling staff if necessary      Date and time of surgery:  TBD  Location of surgery: [facility]- 5th Floor  History and Physical and any other testing necessary prior to surgery:  Yes  Required time line for completion of History and Physical and any pre-op testing:  Yes  Discuss need for someone to drive patient home and stay with them for 24 hours:  Yes  Pre-op showering/scrub information with Surgical Scrub:  Yes  NPO Guidelines: NA  COVID-19 Testing:  Yes    Infection Prevention: Patient demonstrates understanding of the following:  Patient instructed on hand hygiene:  Yes  Surgical procedure site care will be taught and will be  reviewed at the time of discharge  Signs and symptoms of infection taught:  Yes  Wound care reviewed and will be taught at the time of discharge  Central venous catheter care will be taught at the time of discharge (if applicable)    Post-op follow-up:  Instructional materials used/given/mailed:  Surgical logistics, post op teaching sheet, and surgical scrub

## 2022-06-15 NOTE — PATIENT INSTRUCTIONS
You met with Dr. Uche Maldonado.      Today's visit instructions:    Reminder: Surgery Requirements  Your surgery will be at the [facility]- 5th Floor  You will need to arrive 1 hour early based on the location of your surgery.  You will need a Pre-op physical before your procedure- your surgeon will do this the day of surgery.   Stop any blood thinners, vitamins, minerals, or herbal supplements 5 days before surgery.  If you are taking a prescribed blood thinner please let us know for specific instructions.  You may eat/drink/drive without restrictions/limitations.  Wash with the soap (Antibacterial, Dial Complete Foam, Hibiclense, or soap given/mailed from the clinic) the night before and morning of surgery. See instructions in the Surgery Packet.  You will need to undergo a COVID-19 test 2-4 days prior to your scheduled procedure.  Please see the handout . Our surgery scheduler can help arrange testing if you do not have a home test.  If you would like a procedure estimate please call Cost of Care at [phone].       If you have questions please contact Gely RN or Nemo RN during regular clinic hours, Monday through Friday 7:30 AM - 4:00 PM, or you can contact us via [employer] at anytime.       If you have urgent needs after-hours, weekends, or holidays please call the hospital at [phone] and ask to speak with our on-call General Surgery Team.    Appointment [phone]  Nurse Advice (Gely or Nemo): [phone]   Surgery Scheduler (Price): [phone]  Fax: [phone]    After Your Mass/Lump Removal       Incision care   You may take a shower the day after surgery. Carefully wash your incision with soap and water. Do not submerge yourself in water (bath, whirlpool, hot tub, pool, lake) for 14 days after surgery.   Remove the gauze bandage 24 hours after surgery, but leave the medical tape (Steri-Strips) or glue in place. These will loosen and fall off on  their own 1-2 weeks after surgery.     Always wash your hands before touching your incisions or removing bandages.   It is not unusual to form a collection of fluid or blood under your incision that may feel firm or squishy- it can take several weeks to months for your body to reabsorb it.  At times, it may even drain.  If that should happen keep the area clean with soap, water,  and cover with a clean gauze dressing. You can change this daily or as needed.     Other medicines   Wait to start aspirin or blood thinners until the day after surgery. You can continue your regular medicines at your normal time the day after surgery.   Your pain medicine may cause constipation (hard, dry stools). To help with this, take the stool softener your doctor gave you or an over-the-counter stool softener or laxative. You can stop taking this when you are no longer taking pain medicine and your bowel movements are back to normal.      For pain or discomfort   Take the narcotic pain medicine your doctor gave you as needed and as instructed on the bottle. If you prefer to use over-the-counter medication, use acetaminophen (Tylenol) or ibuprofen (Advil, Motrin) as instructed on the box. Do not take Tylenol if it is in your narcotic pain medication.    Use an ice pack on your surgical cut (incision) for 20 minutes at a time as needed for the first 24 hours. Be sure to protect your skin by putting a cloth between the ice pack and your skin.      Activities   No driving until you feel it s safe to do so. Don t drive while taking narcotic pain medicine.      Diet   You can eat your regular meals after surgery.      Results   You should know any test results about 5 to 7 business days after surgery       When to call the doctor   Call your doctor if you have:   A fever above 101 F (38.3 C) (taken under the tongue), or a fever or chills lasting more than a day.   Redness at the incision site.   Any fluid or blood draining from the  incision, especially if it smells bad.    Severe pain that doesn t improve with pain medicine.      We will call you 2 to 4 days after surgery to review this handout, answer questions and help arrange after-surgery care. If you have questions or concerns, please call [phone] during regular office hours. If you need to call after business hours, call [phone] and ask to page the surgeon on-call.

## 2022-06-15 NOTE — PROGRESS NOTES
I saw Camden Wilson today in clinic for evaluation of a mass located at his right upper back.  It has been present for years and is not clearly enlarging over time.  They have a history of similar lesions.   No prior infection or trauma to the site.  They have had no imaging of the lesion.      Smoker: no  Meds: reviewed  All: NDKA   Recent illness: no  Easy bleeding/bruising: yes    Exam:    There were no vitals taken for this visit.  A+Ox3, NAD  RRR  No resp distress or wheezing  Abd nondistended.    Along the right upper back, medial to the scapula there is a palpable mass roughly 2cm in size.  It is noteasily mobile and is not fixed to underlying structures.  There is no overlying skin changes.  Nontender to palpation.  A central pit is noted    Imaging:  none      A/P:  Slowly enlarging soft tissue mass along the right upper back, consistent with a sebaceous cyst.  We discussed the risks/benefits of excision (bleeding, infection, recurrence, injury to surrounding tissue) and possible observation.  They would like to proceed with surgery.     To the OR for excision under local

## 2022-06-17 ENCOUNTER — PATIENT OUTREACH (OUTPATIENT)
Dept: SURGERY | Facility: CLINIC | Age: 42
End: 2022-06-17
Payer: COMMERCIAL

## 2022-06-17 PROBLEM — L72.0 EIC (EPIDERMAL INCLUSION CYST): Status: ACTIVE | Noted: 2022-06-17

## 2022-06-17 NOTE — PROGRESS NOTES
Surgery Scheduled    Date of surgery:  7/12/22    Time of Surgery:  1015    Arrival time:  0915    Surgeon:  Dr. Uche Maldonado    Procedure:  Excision back mass    Anesthesia:  Local    OR Location:  [facility]- 5th Floor    H&P:  Surgeon at time of procedure    COVID Testing:  Patient will do an at home test on 7/10/22 and bring a photo to the procedure    Bowel Prep:  No     and someone to stay with the patient for 24 hours post procedure:   not needed    Surgery packet:  Sent via [employer]

## 2022-07-12 ENCOUNTER — HOSPITAL ENCOUNTER (OUTPATIENT)
Facility: AMBULATORY SURGERY CENTER | Age: 42
Discharge: HOME | End: 2022-07-12
Attending: SURGERY | Admitting: SURGERY
Payer: COMMERCIAL

## 2022-07-12 VITALS
RESPIRATION RATE: 16 BRPM | SYSTOLIC BLOOD PRESSURE: 123 MMHG | WEIGHT: 220 LBS | HEART RATE: 65 BPM | TEMPERATURE: 98.6 F | BODY MASS INDEX: 29.8 KG/M2 | DIASTOLIC BLOOD PRESSURE: 77 MMHG | OXYGEN SATURATION: 100 % | HEIGHT: 72 IN

## 2022-07-12 DIAGNOSIS — L72.0 EIC (EPIDERMAL INCLUSION CYST): ICD-10-CM

## 2022-07-12 PROCEDURE — 11403 EXC TR-EXT B9+MARG 2.1-3CM: CPT

## 2022-07-12 PROCEDURE — 88304 TISSUE EXAM BY PATHOLOGIST: CPT | Mod: 26 | Performed by: PATHOLOGY

## 2022-07-12 PROCEDURE — 11403 EXC TR-EXT B9+MARG 2.1-3CM: CPT | Performed by: SURGERY

## 2022-07-12 PROCEDURE — 88304 TISSUE EXAM BY PATHOLOGIST: CPT | Mod: TC | Performed by: SURGERY

## 2022-07-12 RX ORDER — BUPIVACAINE HYDROCHLORIDE 2.5 MG/ML
INJECTION, SOLUTION INFILTRATION; PERINEURAL PRN
Status: DISCONTINUED | OUTPATIENT
Start: 2022-07-12 | End: 2022-07-12 | Stop reason: HOSPADM

## 2022-07-12 RX ORDER — LIDOCAINE HYDROCHLORIDE AND EPINEPHRINE 10; 10 MG/ML; UG/ML
INJECTION, SOLUTION INFILTRATION; PERINEURAL PRN
Status: DISCONTINUED | OUTPATIENT
Start: 2022-07-12 | End: 2022-07-12 | Stop reason: HOSPADM

## 2022-07-12 NOTE — OP NOTE
[facility] Operative Note    Pre-operative diagnosis: EIC (epidermal inclusion cyst) [L72.0]   Post-operative diagnosis * same*   Procedure: Procedure(s):  EXCISION, MASS, BACK RIGHT   Surgeon: Uche Maldonado MD   Assistants(s): Jey CARO   Estimated blood loss: 15ml    Specimens: Back mass   Findings: 3cm by 2cm mass consistent with epidermal cyst located within the subcutaneous tissue       Camden Wilson was brought to the OR and placed prone on the operating table.  The right back was prepped and draped in sterile fashion and a time out was performed.  Local anesthetic was injected and a 3cm elliptical  incision over the palpable mass was sharply made.  The mass was circumferentially dissected free of surrounding tissue using sharp dissection.  The mass was located within the subcutaneous tissue.  The mass was removed-measuring 3cm by 2cm- and sent to pathology.      Hemostasis was obtained and additional local anesthetic injected.  The incision was closed in layers with 3.0 vicryl and 4.0 monocryl.  Steri-strips were applied.  Camden Wilson was then brought to the recovery area in stable condition.    I performed the procedure.

## 2022-07-12 NOTE — DISCHARGE INSTRUCTIONS
Home care  The following guidelines will help you care for your wound:  Keep the wound clean and dry. If a bandage was applied and it gets wet or dirty, replace it. Otherwise, leave it in place for the first 24 hours. Then change it once a day or as directed.  If stitches (sutures) were used, clean the wound daily:  After removing the bandage, wash the area with soap and water. Use a cotton swab to loosen and remove any blood or crust that forms.  After cleaning, apply a thin layer of petroleum ointment. Or your healthcare provider may advise an antibiotic ointment. This will keep the wound clean and make it easier to remove the stitches. Reapply the bandage.  You may shower as normal after the first 24 hours. But don t soak the area in water (no baths or swimming) until the stitches are removed.  If surgical tape closures were used, keep the area clean and dry. If the area gets wet, pat it dry with a towel. After the surgical tape closures have been removed, it's safe to go back to your normal activities.  You may use over-the-counter pain medicine to control pain, unless another medicine was given. Talk with your provider before using these medicines if you have long-term (chronic) liver or kidney disease, or ever had a stomach ulcer or GI (gastrointestinal) bleeding.  Follow-up care  Most skin wounds heal in 10 days. But an infection may sometimes occur, even with correct treatment. Check the wound daily for the signs of infection listed below. Stitches should be taken out in 7 to 14 days. You may have surgical tape closures. If these have not fallen off after 7 days, you can remove them yourself unless you were told otherwise.  When to get medical advice  Call your healthcare provider right away if any of these occur:  Pain in the wound gets worse  Redness, swelling, or pus coming from the wound  Fever of 100.4 F (38 C) or higher, or as directed by your provider  Stitches come apart or fall out, or surgical  "tape falls off before 5 days  The wound edges reopen  Numb feeling that doesn t go away by the time stitches are removed  Courtney last reviewed this educational content on 8/1/2019 2000-2021 [employer]. All rights reserved. This information is not intended as a substitute for professional medical care. Always follow your healthcare professional's instructions.      [facility]  Home Care Following Your Procedure  Call a doctor if you have signs of infection (fever, growing tenderness at the surgery site, a large amount of drainage or bleeding, severe pain, foul-smelling drainage, redness, swelling).  Today you received a Marcaine or bupivacaine block to numb the nerves near your surgery site.  This is a block using local anesthetic or \"numbing\" medication injected around the nerves to anesthetize or \"numb\" the area supplied by those nerves.  This block is injected into the muscle layer near your surgical site.  The medication may numb the location where you had surgery for 6-18 hours, but may last up to 24 hours.  If your surgical site is an arm or leg you should be careful with your affected limb, since it is possible to injure your limb without being aware of it due to the numbing.  Until full feeling returns, you should guard against bumping or hitting your limb, and avoid extreme hot or cold temperatures on the skin.  As the block wears off, the feeling will return as a tingling or prickly sensation near your surgical site.  You will experience more discomfort from your incision as the feeling returns.  You may want to take a pain pill (a narcotic or Tylenol if this was prescribed by your surgeon) when you start to experience mild pain before the pain becomes more severe.  If your pain medications do not control your pain you should notifiy your surgeon.    Tylenol/Acetaminophen Consumption  To help encourage the safe use of acetaminophen, the makers of " TYLENOL  have lowered the maximum daily dose for single-ingredient Extra Strength TYLENOL  (acetaminophen) products sold in the [place] from 8 pills per day (4,000 mg) to 6 pills per day (3,000 mg). The dosing interval has also changed from 2 pills every 4-6 hours to 2 pills every 6 hours.  If you feel your pain relief is insufficient, you may take Tylenol/Acetaminophen in addition to your narcotic pain medication.   Be careful not to exceed 3,000 mg of Tylenol/Acetaminophen in a 24 hour period from all sources.  If you are taking extra strength Tylenol/acetaminophen (500 mg), the maximum dose is 6 tablets in 24 hours.  If you are taking regular strength acetaminophen (325 mg), the maximum dose is 9 tablets in 24 hours.  Your doctor is:  Dr. [facility] Maldonado [facility]: [phone]                                    Or dial [phone] and ask for the resident on call for:  Orthopaedics  For emergency care, call the:  [facility]: [phone] (TTY for hearing impaired: [phone])

## 2022-07-14 ENCOUNTER — PATIENT OUTREACH (OUTPATIENT)
Dept: SURGERY | Facility: CLINIC | Age: 42
End: 2022-07-14

## 2022-07-14 NOTE — PROGRESS NOTES
RN Post-Op/Post-Discharge Care Coordination Note    Mr. Camden Wilson is a 41 year old male who underwent right back mass excision on 7/12 with  Dr. Uche Maldonado.  Spoke with Patient.    Support  Patient able to care for self independently     Health Status  Nausea/Vomiting: Patient denies nausea/vomiting.  Eating/drinking: Patient is able to eat and drink without any complaints.  Bowel habits: Patient reports having a normal bowel movement.  Drains (ROSA ELENA): N/A  Fevers/chills: Patient denies any fever or chills.  Incisions: Patient denies any signs and symptoms of infection..  Wound closure: Steri-strips  Pain: tolerable, using OTC Tylenol per package instructions at night to help him sleep. He is also using heat.   New Medications:  None    Activity/Restrictions  No restrictions    Equipment  None    Pathology reviewed with patient:  No, not yet available    Forms/Letters  No    All of his questions were answered including reviewing restrictions and wound care.  He will call this office if he has any further questions and/or concerns.      In lieu of a post-op clinic patient that patient would like to be contacted in approximately 7-10 days via telephone.    A copy of this note was routed to the primary surgeon.      Whom and When to Call  Patient acknowledges understanding of how to manage any medication changes and when to seek medical care.     Patient advised that if after hour medical concerns arise to please call [phone] and choose option 4 to speak to the physician on call.

## 2022-07-15 LAB
PATH REPORT.COMMENTS IMP SPEC: NORMAL
PATH REPORT.COMMENTS IMP SPEC: NORMAL
PATH REPORT.FINAL DX SPEC: NORMAL
PATH REPORT.GROSS SPEC: NORMAL
PATH REPORT.MICROSCOPIC SPEC OTHER STN: NORMAL
PATH REPORT.RELEVANT HX SPEC: NORMAL
PHOTO IMAGE: NORMAL

## 2022-07-21 ENCOUNTER — PATIENT OUTREACH (OUTPATIENT)
Dept: SURGERY | Facility: CLINIC | Age: 42
End: 2022-07-21

## 2022-07-21 NOTE — PROGRESS NOTES
Camden Wilson was contacted several days post procedure via telephone for a status update and elected to have a telephone follow-up call approximately 7-10 days after original contact in lieu of a clinic visit with Dr. Uche Maldonado. He continues to do well and the steri-strips have not peeled off.  The patients wounds are healed and the area is C/D/I.  He is afebrile, pain free, and yakelin po; the patient is slowly resuming his normal activity. Discussed restrictions including N/A. Patient noticed swelling underneath of his incision, also has some itching around the incision. No redness, warmth, or purulent drainage noted. Is taking Benadryl at night which is helping. Advised to use heat applications throughout the day for 20min intervals to help with what is likely a seroma. Patient will call back in a week or so if the swelling is not going down. Declines a PO appt at this time.     Pathology was reviewed with the patient: Yes   Final Diagnosis   A.  SKIN, BACK MASS, EXCISION:  -Epidermal inclusion cyst.     All of Camden Wilson question's were answered and  he would like to return to the clinic on a PRN basis.  The patient is aware that he may schedule a post op appointment at any time.    A copy of this note was routed to the patients surgeon.

## 2022-07-23 ENCOUNTER — NURSE TRIAGE (OUTPATIENT)
Dept: NURSING | Facility: CLINIC | Age: 42
End: 2022-07-23

## 2022-07-23 ENCOUNTER — TELEPHONE (OUTPATIENT)
Dept: SURGERY | Facility: CLINIC | Age: 42
End: 2022-07-23

## 2022-07-24 ENCOUNTER — HEALTH MAINTENANCE LETTER (OUTPATIENT)
Age: 42
End: 2022-07-24

## 2022-07-24 NOTE — TELEPHONE ENCOUNTER
Attempted to call patient back @ 11:25pm. Did not . Saw that patient spoke with triage nurse at 10:50pm, agree with nursing plan.     Abner Louie MD   Surgery Resident.

## 2022-07-24 NOTE — TELEPHONE ENCOUNTER
Currently in [place].   Cyst removed 7/12/2022 from his back. He talked to nurse Thursday. He was told he was swollen.   It began leaking @ dinner tonight into his clothing: bloody pinkish fluid. No smell. He still has original bandages partially off. He is not supposed to take them off, there are supposed to come off on their own. The drainage is running down. He has bandages that he can use to reinforce the dressing and absorb the drainage. .   He is not scheduled to see his Dr in follow up. He has dissolvable sutures, steristrips. He thinks it might be just about done draining. There had been a bulge before it began draining. It is flat now.   If it continues draining despite reinforcing the dressing and applying direct pressure, then recommended he be seen in UC or ER in his area.     Brittani Tan RN Triage Nurse Advisor 10:50 PM 7/23/2022

## 2022-07-25 ENCOUNTER — PATIENT OUTREACH (OUTPATIENT)
Dept: SURGERY | Facility: CLINIC | Age: 42
End: 2022-07-25

## 2022-07-25 NOTE — PROGRESS NOTES
Patient calling into the General Surgery Clinic to report that he is in [place] right now and his incision opened up over the weekend and has been draining moderate amounts of serosanguineous fluid. Denies purulent drainage, not malodorous. No redness or warmth noted at the site. His mother  of an open wound infection last year so he is extremely worried about this.     Advised patient to continue washing his incision daily in the shower with soap and water and keep it covered with a dressing. He will come in to see Dr. Flanagan tomorrow for an incision check at 1030. Patient verbalizes understanding of instruction. All questions answered.

## 2022-07-26 ENCOUNTER — OFFICE VISIT (OUTPATIENT)
Dept: SURGERY | Facility: CLINIC | Age: 42
End: 2022-07-26
Payer: COMMERCIAL

## 2022-07-26 VITALS
OXYGEN SATURATION: 97 % | DIASTOLIC BLOOD PRESSURE: 90 MMHG | BODY MASS INDEX: 28.94 KG/M2 | SYSTOLIC BLOOD PRESSURE: 131 MMHG | HEART RATE: 93 BPM | WEIGHT: 213.7 LBS | HEIGHT: 72 IN

## 2022-07-26 DIAGNOSIS — Z98.890 POSTOPERATIVE STATE: Primary | ICD-10-CM

## 2022-07-26 PROCEDURE — 99024 POSTOP FOLLOW-UP VISIT: CPT | Performed by: SURGERY

## 2022-07-26 ASSESSMENT — PAIN SCALES - GENERAL: PAINLEVEL: MODERATE PAIN (4)

## 2022-07-26 NOTE — LETTER
7/26/2022       RE: Camden Wilson  [address]  [facility] [facility] [address]     Dear Colleague,    Thank you for referring your patient, Camden Wilson, to the [facility] GENERAL SURGERY CLINIC [place] at [facility]. Please see a copy of my visit note below.    Camden Wilson is status post:  7/12/2022  Excise mass back (Right) Procedure: EXCISION, MASS, BACK RIGHT;  Surgeon: Uche Maldonado MD;  Location: [facility] OR  Surgery without complications.  Post-op course without complications, until last week when wound started draining. No fevers or signs of infection otherwise.  Incisions examined, no signs of infection, but seroma noted with minimal ooze from medical aspect. Subdermal stitches appear intact.  Redressed with occlusive dressing by patient request.  Reassured that this should stop draining soon.  Will follow up if that is not the case.  All of the patients questions were answered.  Standard post-op instructions and restrictions given.  Follow-up as needed      Again, thank you for allowing me to participate in the care of your patient.      Sincerely,    Tomas Flanagan MD

## 2022-07-26 NOTE — LETTER
Date:July 26, 2022      Provider requested that no letter be sent. Do not send.       [facility]

## 2022-07-26 NOTE — PROGRESS NOTES
Camden Wilson is status post:  7/12/2022  Excise mass back (Right) Procedure: EXCISION, MASS, BACK RIGHT;  Surgeon: Uceh Maldonado MD;  Location: [facility] OR  Surgery without complications.  Post-op course without complications, until last week when wound started draining. No fevers or signs of infection otherwise.  Incisions examined, no signs of infection, but seroma noted with minimal ooze from medical aspect. Subdermal stitches appear intact.  Redressed with occlusive dressing by patient request.  Reassured that this should stop draining soon.  Will follow up if that is not the case.  All of the patients questions were answered.  Standard post-op instructions and restrictions given.  Follow-up as needed

## 2022-07-26 NOTE — NURSING NOTE
Chief Complaint   Patient presents with     Post-Op - General Surgery     Wound check, mass excision       Vitals:    07/26/22 1036   BP: (!) 131/90   BP Location: Left arm   Patient Position: Sitting   Cuff Size: Adult Large   Pulse: 93   SpO2: 97%   Weight: 96.9 kg (213 lb 11.2 oz)   Height: 1.829 m (6')       Body mass index is 28.98 kg/m .                          Steve Bueno, EMT

## 2022-07-26 NOTE — PATIENT INSTRUCTIONS
You met with Dr. Tomas Flanagan.      Today's visit instructions:    Please use heat applications throughout the day for 20min intervals to help with the seroma. Also, clean your incision daily in the shower with soap and water and keep it covered with a dry dressing until it stops draining. Change this dressing daily. Call us at the Nurse Advice line listed below if you have any questions or concerns. Return to the Surgery Clinic on an as needed basis.        If you have questions please contact Gely RN or Nemo RN during regular clinic hours, Monday through Friday 7:30 AM - 4:00 PM, or you can contact us via [email or web address] at anytime.       If you have urgent needs after-hours, weekends, or holidays please call the hospital at [phone] and ask to speak with our on-call General Surgery Team.    Appointment [phone]  Nurse Advice (Gely or Nemo): [phone]   Surgery Scheduler (Price): [phone]  Fax: [phone]

## 2022-07-28 ENCOUNTER — MYC REFILL (OUTPATIENT)
Dept: DERMATOLOGY | Facility: CLINIC | Age: 42
End: 2022-07-28

## 2022-07-28 DIAGNOSIS — L73.0 ACNE NUCHAE KELOIDALIS: ICD-10-CM

## 2022-07-28 RX ORDER — CEPHALEXIN 500 MG/1
500 CAPSULE ORAL 2 TIMES DAILY
Qty: 60 CAPSULE | Refills: 0 | Status: CANCELLED | OUTPATIENT
Start: 2022-07-28

## 2022-07-28 RX ORDER — CLOBETASOL PROPIONATE 0.5 MG/ML
SOLUTION TOPICAL
Qty: 50 ML | Refills: 11 | Status: SHIPPED | OUTPATIENT
Start: 2022-07-28 | End: 2024-05-14

## 2022-07-28 RX ORDER — CEPHALEXIN 500 MG/1
CAPSULE ORAL
Qty: 28 CAPSULE | Refills: 2 | Status: SHIPPED | OUTPATIENT
Start: 2022-07-28 | End: 2023-09-08

## 2022-07-29 NOTE — TELEPHONE ENCOUNTER
cephALEXin (KEFLEX) 500 MG capsule 28 capsule 2 7/28/2022  --   Sig: Take 500 mg (1 tablet) twice daily for 4 days as needed for flares.   Sent to pharmacy as: Cephalexin 500 MG Oral Capsule (KEFLEX)   Class: E-Prescribe   Order: [id number]   E-Prescribing Status: Receipt confirmed by pharmacy (7/28/2022  5:03 PM CDT)       Printout Tracking    External Result Report     Medication Administration Instructions    Take 500 mg (1 tablet) twice daily for 4 days as needed for flares.     Pharmacy    [id number] - [place] [address] [address] AT [facility] AND [address]

## 2022-08-30 NOTE — PROGRESS NOTES
[facility] Dermatology Note  Encounter Date: Aug 31, 2022  Office Visit     Dermatology Problem List:  1. Acne Nuchae Keloidalis/ pseudofolliculitis barbae  - ILK 10 6/18/21, 5/4/2022,  8/31/2022   - clobetasol 0.05% solution, Keflex 500mg po BID x3-4 days prn with flares  2. Cystic acne, back  - Current tx: tretinoin 0.05% cream  - Previous tx: doxycycline 100 mg BID  3. Dermatitis   - TAC 0.5% ointment, Elidel, emollients  4. EIC, right upper mid back , s/p excision 7/12/22   5. Alopecia, androgenetic - minoxidil 5% foam     Social: From [place]. 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   ____________________________________________    Assessment & Plan:    # Atopic dermatitis. Chronic, stable.  - Continue triamcinolone 0.1% cream BID weekend only PRN  - Continue Elidel cream BID on weekdays BID PRN  - Recommended moisturizer immediately after shower     # Acne vulgaris. Chronic, stable/improved. Mostly PIH on exam today.   - Continue tretinoin 0.025% cream nightly   - Continue OTC salicylic acid prn     # Acne nuchae keloidalis/ pseudofolliculitis barbae   - ILK today, see procedure below  - Continue clobetasol 0.05% solution PRN flares for scalp; keflex 500 mg PO BID x 3-4 days PRN flares     # EIC, right upper back, s/p excision 7/12/22  - Appears resolved on exam today with well-healed scar.     Procedures Performed:   - Intra-lesional triamcinolone procedure note. After verbal consent and review of risk of pain and skin thinning/atrophy, positioning and cleansing with isopropyl alcohol, 0.2 total mL of triamcinolone 5 mg/mL was injected into 1 lesion(s) on the left preauricular cheek. The patient tolerated the procedure well and left the dermatology clinic in good condition.    Follow-up: 4 month(s) in-person, or earlier for new or changing lesions    Staff and Scribe:     Scribe Disclosure:  I, JACKIE MACEDO, am serving as a scribe to document services personally  "performed by Omer Recinos MD based on data collection and the provider's statements to me.     Provider Disclosure:   The documentation recorded by the scribe accurately reflects the services I personally performed and the decisions made by me.    Omer Recinos MD    Department of Dermatology  [facility]: Phone: [phone], Fax:[phone]  [facility]: Phone: [phone] Fax: [phone]  ____________________________________________    CC: Derm Problem (Camden is here for a follow up on eczema and acne.  Says it's going good.  Skin feels \"weird\" in area where they removed a cyst. )    HPI:  Mr. Camden Wilson is a(n) 41 year old male who presents today as a return patient for follow up. Last seen by myself on 5/4/22, at which time patient underwent ILK injections for treatment of acne nuchae keloidalis. Additionally, patient was started on Elidel cream and decreased on triamcinolone 0.1% cream for treatment of atopic dermatitis.    Today, patient reports that the scar on his back from the cyst excision is feeling weird and hard. He previously had a hematoma after surgery that began leaking, but is now healed.    Additionally, patient notes the dark spots on his back are improving since he has started using the OTC salicylic acid after running. New acne spots are still forming.     Patient reports that his eczema is improved with the Elidel cream 4 days/week. He is not currently using the clobetasol solution.     Patient is otherwise feeling well, without additional skin concerns.    Labs Reviewed:  N/A    Physical Exam:  Vitals: There were no vitals taken for this visit.  SKIN: Focused examination of the chest, back, and face was performed.  - Hyperpigmented macules on the bilateral back consistent with PIH.  - No active eczematous lesions on exam today.  - Scalp clear.  - Left " preauricular cheek, pink to hyperpigmented perifollicular papules.  - Well healed scar on the right upper mid back at prior cyst excision site.  - No other lesions of concern on areas examined.     Medications:  Current Outpatient Medications   Medication     amLODIPine (NORVASC) 10 MG tablet     cephALEXin (KEFLEX) 500 MG capsule     cephALEXin (KEFLEX) 500 MG capsule     cetirizine (ZYRTEC) 10 MG tablet     clobetasol (TEMOVATE) 0.05 % external solution     fluticasone (FLOVENT HFA) 44 MCG/ACT inhaler     hydrALAZINE (APRESOLINE) 50 MG tablet     irbesartan (AVAPRO) 300 MG tablet     mometasone (NASONEX) 50 MCG/ACT nasal spray     pimecrolimus (ELIDEL) 1 % external cream     spironolactone (ALDACTONE) 25 MG tablet     tretinoin (RETIN-A) 0.05 % external cream     triamcinolone (KENALOG) 0.1 % external cream     Current Facility-Administered Medications   Medication     triamcinolone acetonide (KENALOG-10) injection 3 mg      Past Medical History:   Patient Active Problem List   Diagnosis     EIC (epidermal inclusion cyst)     No past medical history on file.

## 2022-08-31 ENCOUNTER — OFFICE VISIT (OUTPATIENT)
Dept: DERMATOLOGY | Facility: CLINIC | Age: 42
End: 2022-08-31
Payer: COMMERCIAL

## 2022-08-31 DIAGNOSIS — L70.0 ACNE VULGARIS: ICD-10-CM

## 2022-08-31 DIAGNOSIS — L90.5 SCAR: ICD-10-CM

## 2022-08-31 DIAGNOSIS — L73.0 ACNE NUCHAE KELOIDALIS: ICD-10-CM

## 2022-08-31 DIAGNOSIS — L81.8 POST INFLAMMATORY HYPOPIGMENTATION: Primary | ICD-10-CM

## 2022-08-31 DIAGNOSIS — L73.1 PSEUDOFOLLICULITIS BARBAE: ICD-10-CM

## 2022-08-31 DIAGNOSIS — L20.9 ATOPIC DERMATITIS, UNSPECIFIED TYPE: ICD-10-CM

## 2022-08-31 PROCEDURE — 11900 INJECT SKIN LESIONS </W 7: CPT | Performed by: DERMATOLOGY

## 2022-08-31 PROCEDURE — 99214 OFFICE O/P EST MOD 30 MIN: CPT | Mod: 25 | Performed by: DERMATOLOGY

## 2022-08-31 ASSESSMENT — PAIN SCALES - GENERAL: PAINLEVEL: NO PAIN (0)

## 2022-08-31 NOTE — LETTER
Date:September 2, 2022      Patient was self referred, no letter generated. Do not send.        [facility] Health Information

## 2022-08-31 NOTE — NURSING NOTE
"Dermatology Rooming Note    Camden Wilson's goals for this visit include:   Chief Complaint   Patient presents with     Derm Problem     Camden is here for a follow up on eczema and acne.  Says it's going good.  Skin feels \"weird\" in area where they removed a cyst.      Janice Major, CRISTY     "

## 2022-08-31 NOTE — NURSING NOTE
Drug Administration Record    Prior to injection, verified patient identity using patient's name and date of birth.  Due to injection administration, patient instructed to remain in clinic for 15 minutes  afterwards, and to report any adverse reaction to me immediately.    Drug Name: triamcinolone acetonide(kenalog)  Dose: 1mL of triamcinolone 10mg/mL, 10mg dose  Route administered: ID  NDC #: Kenalog-10 (8271-6480-88)  Amount of waste(mL):4mL  Reason for waste: Multi dose vial    LOT #: [id number]  SITE: See Provider's Note  : [employer]  EXPIRATION DATE: 02/29/2024

## 2022-08-31 NOTE — LETTER
8/31/2022       RE: Camden Wilson  [address]  [facility] [address]     Dear Colleague,    Thank you for referring your patient, Camden Wilson, to the [facility] DERMATOLOGY [facility]INIC [place] at [facility]. Please see a copy of my visit note below.    [facility] Dermatology Note  Encounter Date: Aug 31, 2022  Office Visit     Dermatology Problem List:  1. Acne Nuchae Keloidalis/ pseudofolliculitis barbae  - ILK 10 6/18/21, 5/4/2022,  8/31/2022   - clobetasol 0.05% solution, Keflex 500mg po BID x3-4 days prn with flares  2. Cystic acne, back  - Current tx: tretinoin 0.05% cream  - Previous tx: doxycycline 100 mg BID  3. Dermatitis   - TAC 0.5% ointment, Elidel, emollients  4. EIC, right upper mid back , s/p excision 7/12/22   5. Alopecia, androgenetic - minoxidil 5% foam     Social: From [place]. Works for [employer]. [employer] education.   ____________________________________________    Assessment & Plan:    # Atopic dermatitis. Chronic, stable.  - Continue triamcinolone 0.1% cream BID weekend only PRN  - Continue Elidel cream BID on weekdays BID PRN  - Recommended moisturizer immediately after shower     # Acne vulgaris. Chronic, stable/improved. Mostly PIH on exam today.   - Continue tretinoin 0.025% cream nightly   - Continue OTC salicylic acid prn     # Acne nuchae keloidalis/ pseudofolliculitis barbae   - ILK today, see procedure below  - Continue clobetasol 0.05% solution PRN flares for scalp; keflex 500 mg PO BID x 3-4 days PRN flares     # EIC, right upper back, s/p excision 7/12/22  - Appears resolved on exam today with well-healed scar.     Procedures Performed:   - Intra-lesional triamcinolone procedure note. After verbal consent and review of risk of pain and skin thinning/atrophy, positioning and cleansing with isopropyl alcohol, 0.2 total mL of triamcinolone 5 mg/mL was injected into 1  "lesion(s) on the left preauricular cheek. The patient tolerated the procedure well and left the dermatology clinic in good condition.    Follow-up: 4 month(s) in-person, or earlier for new or changing lesions    Staff and Scribe:     Scribe Disclosure:  I, JACKIE MACEDO, am serving as a scribe to document services personally performed by Omer Recinos MD based on data collection and the provider's statements to me.     Provider Disclosure:   The documentation recorded by the scribe accurately reflects the services I personally performed and the decisions made by me.    Omer Recinos MD    Department of Dermatology  [facility]note 2]: Phone: [phone], Fax:[phone]  [facility]: Phone: [phone] Fax: [phone]  ____________________________________________    CC: Derm Problem (Camden is here for a follow up on eczema and acne.  Says it's going good.  Skin feels \"weird\" in area where they removed a cyst. )    HPI:  Mr. Camden Wilson is a(n) 41 year old male who presents today as a return patient for follow up. Last seen by myself on 5/4/22, at which time patient underwent ILK injections for treatment of acne nuchae keloidalis. Additionally, patient was started on Elidel cream and decreased on triamcinolone 0.1% cream for treatment of atopic dermatitis.    Today, patient reports that the scar on his back from the cyst excision is feeling weird and hard. He previously had a hematoma after surgery that began leaking, but is now healed.    Additionally, patient notes the dark spots on his back are improving since he has started using the OTC salicylic acid after running. New acne spots are still forming.     Patient reports that his eczema is improved with the Elidel cream 4 days/week. He is not currently using the clobetasol solution.     Patient is otherwise feeling well, without " additional skin concerns.    Labs Reviewed:  N/A    Physical Exam:  Vitals: There were no vitals taken for this visit.  SKIN: Focused examination of the chest, back, and face was performed.  - Hyperpigmented macules on the bilateral back consistent with PIH.  - No active eczematous lesions on exam today.  - Scalp clear.  - Left preauricular cheek, pink to hyperpigmented perifollicular papules.  - Well healed scar on the right upper mid back at prior cyst excision site.  - No other lesions of concern on areas examined.     Medications:  Current Outpatient Medications   Medication     amLODIPine (NORVASC) 10 MG tablet     cephALEXin (KEFLEX) 500 MG capsule     cephALEXin (KEFLEX) 500 MG capsule     cetirizine (ZYRTEC) 10 MG tablet     clobetasol (TEMOVATE) 0.05 % external solution     fluticasone (FLOVENT HFA) 44 MCG/ACT inhaler     hydrALAZINE (APRESOLINE) 50 MG tablet     irbesartan (AVAPRO) 300 MG tablet     mometasone (NASONEX) 50 MCG/ACT nasal spray     pimecrolimus (ELIDEL) 1 % external cream     spironolactone (ALDACTONE) 25 MG tablet     tretinoin (RETIN-A) 0.05 % external cream     triamcinolone (KENALOG) 0.1 % external cream     Current Facility-Administered Medications   Medication     triamcinolone acetonide (KENALOG-10) injection 3 mg      Past Medical History:   Patient Active Problem List   Diagnosis     EIC (epidermal inclusion cyst)     No past medical history on file.         Again, thank you for allowing me to participate in the care of your patient.      Sincerely,    Omer Recinos MD

## 2022-10-03 ENCOUNTER — HEALTH MAINTENANCE LETTER (OUTPATIENT)
Age: 42
End: 2022-10-03

## 2023-06-29 DIAGNOSIS — L70.0 ACNE VULGARIS: ICD-10-CM

## 2023-07-05 RX ORDER — TRETINOIN 0.5 MG/G
CREAM TOPICAL AT BEDTIME
Qty: 45 G | Refills: 0 | Status: SHIPPED | OUTPATIENT
Start: 2023-07-05 | End: 2023-09-08

## 2023-07-05 NOTE — TELEPHONE ENCOUNTER
tretinoin (RETIN-A) 0.05 % external cream (Discontinued)    Discontinued Reorder (No AVS / No eCancel) Doege, Kathleen M, RN 7/5/23 2548

## 2023-07-10 ENCOUNTER — TELEPHONE (OUTPATIENT)
Dept: DERMATOLOGY | Facility: CLINIC | Age: 43
End: 2023-07-10
Payer: COMMERCIAL

## 2023-07-10 NOTE — TELEPHONE ENCOUNTER
Prior Authorization Retail Medication Request    Medication/Dose: tretinoin (RETIN-A) 0.05 % external cream      ICD code (if different than what is on RX):  Acne vulgaris [L70.0]   Previously Tried and Failed:  See provider's notes    Ins[facility]  [facility] Ph: [phone]   Insurance ID:  [id number]       Pharmacy Information (if different than what is on RX)  Name:  [id number] - [place] [address] [address] [facility] [address] [facility] [address]  Phone:  [phone]

## 2023-07-12 NOTE — TELEPHONE ENCOUNTER
PA Initiation    Medication: TRETINOIN 0.05 % EX CREA  Insurance Company: [employer] - Phone [phone] Fax [phone]  Pharmacy Filling the Rx: [facility] #[id number] [place] [address] [address] AT [facility] [address]  Filling Pharmacy Phone: [phone]  Filling Pharmacy Fax: [phone]  Start Date: 7/12/2023

## 2023-07-13 NOTE — TELEPHONE ENCOUNTER
Prior Authorization Approval    Medication: TRETINOIN 0.05 % EX CREA  Authorization Effective Date: 7/13/2023  Authorization Expiration Date: 7/13/2024  Approved Dose/Quantity:   Reference #:     Insurance Company: Kenny - Phone [phone] Fax [phone]  Expected CoPay:       CoPay Card Available:      Financial Assistance Needed:   Which Pharmacy is filling the prescription: "[facility] #[id number] - [place], [place] - [address]  Pharmacy Notified: Yes  Patient Notified: Yes **Instructed pharmacy to notify patient when script is ready to /ship.**    Received approval info verbally.

## 2023-08-12 ENCOUNTER — HEALTH MAINTENANCE LETTER (OUTPATIENT)
Age: 43
End: 2023-08-12

## 2023-09-08 ENCOUNTER — OFFICE VISIT (OUTPATIENT)
Dept: DERMATOLOGY | Facility: CLINIC | Age: 43
End: 2023-09-08
Payer: COMMERCIAL

## 2023-09-08 DIAGNOSIS — L73.0 ACNE NUCHAE KELOIDALIS: ICD-10-CM

## 2023-09-08 DIAGNOSIS — L70.0 ACNE VULGARIS: ICD-10-CM

## 2023-09-08 DIAGNOSIS — L20.9 ATOPIC DERMATITIS, UNSPECIFIED TYPE: ICD-10-CM

## 2023-09-08 PROCEDURE — 11901 INJECT SKIN LESIONS >7: CPT | Performed by: STUDENT IN AN ORGANIZED HEALTH CARE EDUCATION/TRAINING PROGRAM

## 2023-09-08 PROCEDURE — 99214 OFFICE O/P EST MOD 30 MIN: CPT | Mod: 25 | Performed by: STUDENT IN AN ORGANIZED HEALTH CARE EDUCATION/TRAINING PROGRAM

## 2023-09-08 RX ORDER — CEPHALEXIN 500 MG/1
CAPSULE ORAL
Qty: 28 CAPSULE | Refills: 2 | Status: SHIPPED | OUTPATIENT
Start: 2023-09-08

## 2023-09-08 RX ORDER — TRETINOIN 0.5 MG/G
CREAM TOPICAL AT BEDTIME
Qty: 45 G | Refills: 0 | Status: SHIPPED | OUTPATIENT
Start: 2023-09-08

## 2023-09-08 RX ORDER — TRIAMCINOLONE ACETONIDE 1 MG/G
CREAM TOPICAL
Qty: 454 G | Refills: 11 | Status: SHIPPED | OUTPATIENT
Start: 2023-09-08

## 2023-09-08 ASSESSMENT — PAIN SCALES - GENERAL: PAINLEVEL: NO PAIN (0)

## 2023-09-08 NOTE — LETTER
9/8/2023       RE: Camden Wilson  [address]  [facility] [place] [facility] [place] [facility] [address]     Dear Colleague,    Thank you for referring your patient, Camden Wilson, to the [facility] DERMATOLOGY CL[facility]IC [place] at [facility]. Please see a copy of my visit note below.    [facility] Dermatology Note    Encounter Date: Sep 8, 2023    Dermatology Problem List:  1. Acne Nuchae Keloidalis/ pseudofolliculitis barbae  - ILK 10 6/18/21, 5/4/2022,  8/31/2022   - clobetasol 0.05% solution, Keflex 500mg po BID x3-4 days prn with flares  2. Cystic acne, back  - Current tx: tretinoin 0.05% cream  - Previous tx: doxycycline 100 mg BID  3. Dermatitis   - TAC 0.5% ointment, Elidel, emollients  4. EIC, right upper mid back , s/p excision 7/12/22   5. Alopecia, androgenetic - minoxidil 5% foam     Social: From [place]. Works for [employer]. [employer].   -   ______________________________________    Impression/Plan:  Camden was seen today for derm problem.    Diagnoses and all orders for this visit:    Acne nuchae keloidalis  -     cephALEXin (KEFLEX) 500 MG capsule; Take 500 mg (1 tablet) twice daily for 4 days as needed for flares.  -Flaring  - Recommend continuing clobetasol twice daily as needed  - Recommend keeping the hair longer, however likely not possible due to patient's personal style preferences  - Do recommend using a soft toothbrush multiple times a day with a week or 2 after shaving to help loosen up any areas that may be curling back into the skin and causing an inflammatory reaction  - 0.5ml ILK to 18 spots     Acne vulgaris  -     tretinoin (RETIN-A) 0.05 % external cream; Apply topically At Bedtime To the back.  -Continue nightly    Atopic dermatitis, unspecified type  -     triamcinolone (KENALOG) 0.1 % external cream; Apply twice daily on weekends only to eczema on the back  -Continue twice daily as  needed      After positioning and cleansing with isopropyl alcohol, 0.5 total cc of Kenalog 10 mg/cc was injected into 18  Lesion(s) on the occipital scalp and neck.  The patient tolerated the procedure well and left the Dermatology clinic in good condition.      Follow-up in .       Staff Involved:  Staff Only    Blaine Duque MD   of Dermatology  Department of Dermatology  [facility]      CC:   Chief Complaint   Patient presents with    Derm Problem     HS follow up.  Bump on scalp and face.         History of Present Illness:  Mr. Camden Wilson is a 42 year old male who presents as a return patient.    Patient was last seen a year ago for stable atopic dermatitis using triamcinolone and Elidel twice daily as needed.  As well as acne doing well on tretinoin 0.025% cream and OTC Sal-Acid, a cayenne which was treated with IL K and managed long-term with clobetasol as well as as needed Keflex.    Has some bumps on the back of his neck and face. Would like injections of these areas-overall things are doing okay except for recent flare.  Complains that the quiñones is hearing very good and do not do a good job of removing the self penetrating hairs    Labs:      Physical exam:  Vitals: There were no vitals taken for this visit.  GEN: well developed, well-nourished, in no acute distress, in a pleasant mood.     SKIN: Osorio phototype 4  - Sun-exposed skin, which includes the head/face, neck, both arms, digits, and/or nails was examined.   -Firm follicularly based papules some of which have hairs curling back into the epidermis, distributed densely bilateral neck, and less densely in the posterior scalp  - No other lesions of concern on areas examined.     Past Medical History:   No past medical history on file.  Past Surgical History:   Procedure Laterality Date    EXCISE MASS BACK Right 7/12/2022    Procedure: EXCISION, MASS, BACK RIGHT;  Surgeon: Uche Maldonado  MD Michael;  Location: [facility] OR       Social History:   reports that he has never smoked. He has never used smokeless tobacco. He reports that he does not drink alcohol and does not use drugs.    Family History:  No family history on file.    Medications:  Current Outpatient Medications   Medication Sig Dispense Refill    cephALEXin (KEFLEX) 500 MG capsule Take 500 mg (1 tablet) twice daily for 4 days as needed for flares. 28 capsule 2    cetirizine (ZYRTEC) 10 MG tablet Take 10 mg by mouth      clobetasol (TEMOVATE) 0.05 % external solution Apply twice daily as needed for rash on the scalp 50 mL 11    fluticasone (FLOVENT HFA) 44 MCG/ACT inhaler Inhale 1 puff into the lungs      hydrALAZINE (APRESOLINE) 50 MG tablet 1 to 1and 1/2 tablets twice a day as directed      mometasone (NASONEX) 50 MCG/ACT nasal spray 2 sprays      pimecrolimus (ELIDEL) 1 % external cream Apply twice daily as needed for rash on the back on weekdays only. 100 g 11    tretinoin (RETIN-A) 0.05 % external cream Apply topically At Bedtime To the back. 45 g 0    triamcinolone (KENALOG) 0.1 % external cream Apply twice daily on weekends only to eczema on the back 454 g 11    amLODIPine (NORVASC) 10 MG tablet Take 10 mg by mouth      irbesartan (AVAPRO) 300 MG tablet Take 300 mg by mouth daily      spironolactone (ALDACTONE) 25 MG tablet Take 25 mg by mouth daily       No Known Allergies              Drug Administration Record    Prior to injection, verified patient identity using patient's name and date of birth.  Due to injection administration, patient instructed to remain in clinic for 15 minutes  afterwards, and to report any adverse reaction to me immediately.    Drug Name: triamcinolone acetonide(kenalog)  Dose: 0.5mL of triamcinolone 10mg/mL, 5mg dose  Route administered: ID  NDC #: Kenalog-10 (7508-1506-06)  Amount of waste(mL):4.5  Reason for waste: Single use vial    LOT #: [id number]  SITE: [facility]   : [employer]  EXPIRATION DATE: 10/2025

## 2023-09-08 NOTE — PROGRESS NOTES
[facility] Dermatology Note    Encounter Date: Sep 8, 2023    Dermatology Problem List:  1. Acne Nuchae Keloidalis/ pseudofolliculitis barbae  - ILK 10 6/18/21, 5/4/2022,  8/31/2022   - clobetasol 0.05% solution, Keflex 500mg po BID x3-4 days prn with flares  2. Cystic acne, back  - Current tx: tretinoin 0.05% cream  - Previous tx: doxycycline 100 mg BID  3. Dermatitis   - TAC 0.5% ointment, Elidel, emollients  4. EIC, right upper mid back , s/p excision 7/12/22   5. Alopecia, androgenetic - minoxidil 5% foam     Social: From [place]. Works for [employer]. [employer].   -   ______________________________________    Impression/Plan:  Camden was seen today for derm problem.    Diagnoses and all orders for this visit:    Acne nuchae keloidalis  -     cephALEXin (KEFLEX) 500 MG capsule; Take 500 mg (1 tablet) twice daily for 4 days as needed for flares.  -Flaring  - Recommend continuing clobetasol twice daily as needed  - Recommend keeping the hair longer, however likely not possible due to patient's personal style preferences  - Do recommend using a soft toothbrush multiple times a day with a week or 2 after shaving to help loosen up any areas that may be curling back into the skin and causing an inflammatory reaction  - 0.5ml ILK to 18 spots     Acne vulgaris  -     tretinoin (RETIN-A) 0.05 % external cream; Apply topically At Bedtime To the back.  -Continue nightly    Atopic dermatitis, unspecified type  -     triamcinolone (KENALOG) 0.1 % external cream; Apply twice daily on weekends only to eczema on the back  -Continue twice daily as needed      After positioning and cleansing with isopropyl alcohol, 0.5 total cc of Kenalog 10 mg/cc was injected into 18  Lesion(s) on the occipital scalp and neck.  The patient tolerated the procedure well and left the Dermatology clinic in good condition.      Follow-up in .       Staff Involved:  Staff Only    Blaine Duque  MD   of Dermatology  Department of Dermatology  [facility] [employer] [facility]      CC:   Chief Complaint   Patient presents with    Derm Problem     HS follow up.  Bump on scalp and face.         History of Present Illness:  Mr. Camden Wilson is a 42 year old male who presents as a return patient.    Patient was last seen a year ago for stable atopic dermatitis using triamcinolone and Elidel twice daily as needed.  As well as acne doing well on tretinoin 0.025% cream and OTC Sal-Acid, a cayenne which was treated with IL K and managed long-term with clobetasol as well as as needed Keflex.    Has some bumps on the back of his neck and face. Would like injections of these areas-overall things are doing okay except for recent flare.  Complains that the quiñones is hearing very good and do not do a good job of removing the self penetrating hairs    Labs:      Physical exam:  Vitals: There were no vitals taken for this visit.  GEN: well developed, well-nourished, in no acute distress, in a pleasant mood.     SKIN: Osorio phototype 4  - Sun-exposed skin, which includes the head/face, neck, both arms, digits, and/or nails was examined.   -Firm follicularly based papules some of which have hairs curling back into the epidermis, distributed densely bilateral neck, and less densely in the posterior scalp  - No other lesions of concern on areas examined.     Past Medical History:   No past medical history on file.  Past Surgical History:   Procedure Laterality Date    EXCISE MASS BACK Right 7/12/2022    Procedure: EXCISION, MASS, BACK RIGHT;  Surgeon: Uche Maldonado MD;  Location: [facility] OR       Social History:   reports that he has never smoked. He has never used smokeless tobacco. He reports that he does not drink alcohol and does not use drugs.    Family History:  No family history on file.    Medications:  Current Outpatient Medications   Medication Sig Dispense Refill     cephALEXin (KEFLEX) 500 MG capsule Take 500 mg (1 tablet) twice daily for 4 days as needed for flares. 28 capsule 2    cetirizine (ZYRTEC) 10 MG tablet Take 10 mg by mouth      clobetasol (TEMOVATE) 0.05 % external solution Apply twice daily as needed for rash on the scalp 50 mL 11    fluticasone (FLOVENT HFA) 44 MCG/ACT inhaler Inhale 1 puff into the lungs      hydrALAZINE (APRESOLINE) 50 MG tablet 1 to 1and 1/2 tablets twice a day as directed      mometasone (NASONEX) 50 MCG/ACT nasal spray 2 sprays      pimecrolimus (ELIDEL) 1 % external cream Apply twice daily as needed for rash on the back on weekdays only. 100 g 11    tretinoin (RETIN-A) 0.05 % external cream Apply topically At Bedtime To the back. 45 g 0    triamcinolone (KENALOG) 0.1 % external cream Apply twice daily on weekends only to eczema on the back 454 g 11    amLODIPine (NORVASC) 10 MG tablet Take 10 mg by mouth      irbesartan (AVAPRO) 300 MG tablet Take 300 mg by mouth daily      spironolactone (ALDACTONE) 25 MG tablet Take 25 mg by mouth daily       No Known Allergies

## 2023-09-08 NOTE — PROGRESS NOTES
Drug Administration Record    Prior to injection, verified patient identity using patient's name and date of birth.  Due to injection administration, patient instructed to remain in clinic for 15 minutes  afterwards, and to report any adverse reaction to me immediately.    Drug Name: triamcinolone acetonide(kenalog)  Dose: 0.5mL of triamcinolone 10mg/mL, 5mg dose  Route administered: ID  NDC #: Kenalog-10 (9045-8244-35)  Amount of waste(mL):4.5  Reason for waste: Single use vial    LOT #: [id number]  SITE: [facility]   : [employer]  EXPIRATION DATE: 10/2025

## 2023-09-08 NOTE — NURSING NOTE
Dermatology Rooming Note    Camden Wilson's goals for this visit include:   Chief Complaint   Patient presents with    Derm Problem     HS follow up.  Bump on scalp and face.       Janice Major, CMA

## 2024-05-14 DIAGNOSIS — L73.0 ACNE NUCHAE KELOIDALIS: ICD-10-CM

## 2024-05-14 RX ORDER — CLOBETASOL PROPIONATE 0.5 MG/ML
SOLUTION TOPICAL
Qty: 50 ML | Refills: 11 | Status: SHIPPED | OUTPATIENT
Start: 2024-05-14

## 2024-05-14 NOTE — TELEPHONE ENCOUNTER
Writer received a faxed refill request from [facility] for this patient's clobetasol (TEMOVATE) 0.05 % external solution. Writer will forward to team RN for review.     Previous [facility] patient, seen last with Dr. Duque on 9/8/23.

## 2024-10-05 ENCOUNTER — HEALTH MAINTENANCE LETTER (OUTPATIENT)
Age: 44
End: 2024-10-05

## (undated) DEVICE — SU VICRYL 3-0 FS-1 27" J442H

## (undated) DEVICE — PAD CHUX UNDERPAD 30X30"

## (undated) DEVICE — PREP CHLORAPREP 26ML TINTED ORANGE  260815

## (undated) DEVICE — DRSG GAUZE 4X4" 3033

## (undated) DEVICE — SU MONOCRYL 4-0 PS-2 18" UND Y496G

## (undated) DEVICE — DRSG PRIMAPORE 02X3" 7133

## (undated) DEVICE — SOL NACL 0.9% IRRIG 500ML BOTTLE 2F7123

## (undated) DEVICE — GLOVE PROTEXIS BLUE W/NEU-THERA 8.0  2D73EB80

## (undated) DEVICE — DRSG STERI STRIP 1/4X3" R1541

## (undated) DEVICE — SU ETHILON 2-0 PS 18" 585H

## (undated) DEVICE — SU VICRYL 4-0 PS-2 18" UND J496H

## (undated) DEVICE — DRSG TEGADERM 4X4 3/4" 1626W

## (undated) DEVICE — LINEN TOWEL PACK X5 5464

## (undated) DEVICE — SPECIMEN CONTAINER W/10% BUFFERED FORMALIN 120ML 591201

## (undated) DEVICE — SU VICRYL 2-0 CP-2 27" UND J869H

## (undated) DEVICE — GLOVE PROTEXIS W/NEU-THERA 7.5  2D73TE75

## (undated) DEVICE — PACK MINOR CUSTOM ASC

## (undated) RX ORDER — BUPIVACAINE HYDROCHLORIDE 2.5 MG/ML
INJECTION, SOLUTION EPIDURAL; INFILTRATION; INTRACAUDAL
Status: DISPENSED
Start: 2022-07-12

## (undated) RX ORDER — LIDOCAINE HYDROCHLORIDE AND EPINEPHRINE 10; 10 MG/ML; UG/ML
INJECTION, SOLUTION INFILTRATION; PERINEURAL
Status: DISPENSED
Start: 2022-07-12